# Patient Record
Sex: MALE | Race: WHITE | NOT HISPANIC OR LATINO | Employment: FULL TIME | URBAN - METROPOLITAN AREA
[De-identification: names, ages, dates, MRNs, and addresses within clinical notes are randomized per-mention and may not be internally consistent; named-entity substitution may affect disease eponyms.]

---

## 2020-04-29 ENCOUNTER — OFFICE VISIT (OUTPATIENT)
Dept: FAMILY MEDICINE CLINIC | Facility: CLINIC | Age: 50
End: 2020-04-29
Payer: COMMERCIAL

## 2020-04-29 VITALS
HEIGHT: 72 IN | WEIGHT: 211 LBS | SYSTOLIC BLOOD PRESSURE: 132 MMHG | TEMPERATURE: 97.5 F | DIASTOLIC BLOOD PRESSURE: 82 MMHG | BODY MASS INDEX: 28.58 KG/M2 | HEART RATE: 74 BPM | RESPIRATION RATE: 16 BRPM | OXYGEN SATURATION: 98 %

## 2020-04-29 DIAGNOSIS — M25.532 LEFT WRIST PAIN: ICD-10-CM

## 2020-04-29 DIAGNOSIS — M77.8 LEFT ELBOW TENDONITIS: Primary | ICD-10-CM

## 2020-04-29 DIAGNOSIS — F41.1 GENERALIZED ANXIETY DISORDER: ICD-10-CM

## 2020-04-29 PROCEDURE — 1036F TOBACCO NON-USER: CPT | Performed by: FAMILY MEDICINE

## 2020-04-29 PROCEDURE — 3008F BODY MASS INDEX DOCD: CPT | Performed by: FAMILY MEDICINE

## 2020-04-29 PROCEDURE — 99204 OFFICE O/P NEW MOD 45 MIN: CPT | Performed by: FAMILY MEDICINE

## 2020-04-29 RX ORDER — PAROXETINE 30 MG/1
1 TABLET, FILM COATED ORAL DAILY
COMMUNITY
Start: 2015-06-08

## 2020-06-02 VITALS
BODY MASS INDEX: 29.01 KG/M2 | HEIGHT: 72 IN | SYSTOLIC BLOOD PRESSURE: 126 MMHG | WEIGHT: 214.2 LBS | DIASTOLIC BLOOD PRESSURE: 90 MMHG

## 2020-06-02 DIAGNOSIS — M25.532 LEFT WRIST PAIN: ICD-10-CM

## 2020-06-02 DIAGNOSIS — M77.8 LEFT ELBOW TENDONITIS: ICD-10-CM

## 2020-06-02 DIAGNOSIS — G56.22 ULNAR NEURITIS, LEFT: ICD-10-CM

## 2020-06-02 DIAGNOSIS — M77.12 LATERAL EPICONDYLITIS OF LEFT ELBOW: Primary | ICD-10-CM

## 2020-06-02 PROCEDURE — 3008F BODY MASS INDEX DOCD: CPT | Performed by: FAMILY MEDICINE

## 2020-06-02 PROCEDURE — 20605 DRAIN/INJ JOINT/BURSA W/O US: CPT | Performed by: FAMILY MEDICINE

## 2020-06-02 PROCEDURE — 99204 OFFICE O/P NEW MOD 45 MIN: CPT | Performed by: FAMILY MEDICINE

## 2020-06-02 PROCEDURE — 1036F TOBACCO NON-USER: CPT | Performed by: FAMILY MEDICINE

## 2020-06-02 RX ORDER — TRIAMCINOLONE ACETONIDE 40 MG/ML
40 INJECTION, SUSPENSION INTRA-ARTICULAR; INTRAMUSCULAR
Status: COMPLETED | OUTPATIENT
Start: 2020-06-02 | End: 2020-06-02

## 2020-06-02 RX ORDER — LIDOCAINE HYDROCHLORIDE 10 MG/ML
2 INJECTION, SOLUTION INFILTRATION; PERINEURAL
Status: COMPLETED | OUTPATIENT
Start: 2020-06-02 | End: 2020-06-02

## 2020-06-02 RX ADMIN — LIDOCAINE HYDROCHLORIDE 2 ML: 10 INJECTION, SOLUTION INFILTRATION; PERINEURAL at 10:57

## 2020-06-02 RX ADMIN — TRIAMCINOLONE ACETONIDE 40 MG: 40 INJECTION, SUSPENSION INTRA-ARTICULAR; INTRAMUSCULAR at 10:57

## 2020-06-08 ENCOUNTER — EVALUATION (OUTPATIENT)
Dept: PHYSICAL THERAPY | Facility: CLINIC | Age: 50
End: 2020-06-08
Payer: COMMERCIAL

## 2020-06-08 DIAGNOSIS — M77.8 LEFT ELBOW TENDONITIS: Primary | ICD-10-CM

## 2020-06-08 DIAGNOSIS — M25.532 LEFT WRIST PAIN: ICD-10-CM

## 2020-06-08 PROCEDURE — 97110 THERAPEUTIC EXERCISES: CPT

## 2020-06-08 PROCEDURE — 97162 PT EVAL MOD COMPLEX 30 MIN: CPT

## 2020-06-11 ENCOUNTER — OFFICE VISIT (OUTPATIENT)
Dept: PHYSICAL THERAPY | Facility: CLINIC | Age: 50
End: 2020-06-11
Payer: COMMERCIAL

## 2020-06-11 DIAGNOSIS — M77.8 LEFT ELBOW TENDONITIS: Primary | ICD-10-CM

## 2020-06-11 DIAGNOSIS — M25.532 LEFT WRIST PAIN: ICD-10-CM

## 2020-06-11 PROCEDURE — 97110 THERAPEUTIC EXERCISES: CPT

## 2020-06-11 PROCEDURE — 97140 MANUAL THERAPY 1/> REGIONS: CPT

## 2020-06-15 ENCOUNTER — OFFICE VISIT (OUTPATIENT)
Dept: PHYSICAL THERAPY | Facility: CLINIC | Age: 50
End: 2020-06-15
Payer: COMMERCIAL

## 2020-06-15 DIAGNOSIS — M77.8 LEFT ELBOW TENDONITIS: Primary | ICD-10-CM

## 2020-06-15 DIAGNOSIS — M25.532 LEFT WRIST PAIN: ICD-10-CM

## 2020-06-15 PROCEDURE — 97110 THERAPEUTIC EXERCISES: CPT

## 2020-06-15 PROCEDURE — 97140 MANUAL THERAPY 1/> REGIONS: CPT

## 2020-06-18 ENCOUNTER — OFFICE VISIT (OUTPATIENT)
Dept: PHYSICAL THERAPY | Facility: CLINIC | Age: 50
End: 2020-06-18
Payer: COMMERCIAL

## 2020-06-18 DIAGNOSIS — M77.8 LEFT ELBOW TENDONITIS: Primary | ICD-10-CM

## 2020-06-18 DIAGNOSIS — M25.532 LEFT WRIST PAIN: ICD-10-CM

## 2020-06-18 PROCEDURE — 97140 MANUAL THERAPY 1/> REGIONS: CPT

## 2020-06-18 PROCEDURE — 97110 THERAPEUTIC EXERCISES: CPT

## 2020-06-22 ENCOUNTER — OFFICE VISIT (OUTPATIENT)
Dept: PHYSICAL THERAPY | Facility: CLINIC | Age: 50
End: 2020-06-22
Payer: COMMERCIAL

## 2020-06-22 DIAGNOSIS — M77.8 LEFT ELBOW TENDONITIS: Primary | ICD-10-CM

## 2020-06-22 DIAGNOSIS — M25.532 LEFT WRIST PAIN: ICD-10-CM

## 2020-06-22 PROCEDURE — 97140 MANUAL THERAPY 1/> REGIONS: CPT

## 2020-06-22 PROCEDURE — 97110 THERAPEUTIC EXERCISES: CPT

## 2020-06-25 ENCOUNTER — OFFICE VISIT (OUTPATIENT)
Dept: PHYSICAL THERAPY | Facility: CLINIC | Age: 50
End: 2020-06-25
Payer: COMMERCIAL

## 2020-06-25 DIAGNOSIS — M77.8 LEFT ELBOW TENDONITIS: Primary | ICD-10-CM

## 2020-06-25 DIAGNOSIS — M25.532 LEFT WRIST PAIN: ICD-10-CM

## 2020-06-25 PROCEDURE — 97140 MANUAL THERAPY 1/> REGIONS: CPT

## 2020-06-25 PROCEDURE — 97110 THERAPEUTIC EXERCISES: CPT

## 2020-06-29 ENCOUNTER — OFFICE VISIT (OUTPATIENT)
Dept: PHYSICAL THERAPY | Facility: CLINIC | Age: 50
End: 2020-06-29
Payer: COMMERCIAL

## 2020-06-29 DIAGNOSIS — M25.532 LEFT WRIST PAIN: ICD-10-CM

## 2020-06-29 DIAGNOSIS — M77.8 LEFT ELBOW TENDONITIS: Primary | ICD-10-CM

## 2020-06-29 PROCEDURE — 97110 THERAPEUTIC EXERCISES: CPT

## 2020-07-02 ENCOUNTER — OFFICE VISIT (OUTPATIENT)
Dept: PHYSICAL THERAPY | Facility: CLINIC | Age: 50
End: 2020-07-02
Payer: COMMERCIAL

## 2020-07-02 DIAGNOSIS — M25.532 LEFT WRIST PAIN: ICD-10-CM

## 2020-07-02 DIAGNOSIS — M77.8 LEFT ELBOW TENDONITIS: Primary | ICD-10-CM

## 2020-07-02 PROCEDURE — 97110 THERAPEUTIC EXERCISES: CPT

## 2020-07-02 PROCEDURE — 97140 MANUAL THERAPY 1/> REGIONS: CPT

## 2020-07-02 NOTE — PROGRESS NOTES
Daily Note     Today's date: 2020  Patient name: Milan Noguera  : 1970  MRN: [id number]  Referring provider: Naomy Lugo MD  Dx:   Encounter Diagnosis     ICD-10-CM    1  Left elbow tendonitis M77 8    2  Left wrist pain M25 532                   Subjective: Pt reports he had "stiffness" in L elbow when WBing through wrists while pushing loads  Denies pain  Objective: See treatment diary below      Assessment: Tolerated treatment well  Resumed manual techniques to address pt's subjective report of jt stiffness  Progressed elbow PRE's to [employer] machine to progress eccentric strengthening, pt tolerated well w/o onset of pain  Pt is making steady progress toward established goals  Patient exhibited good technique with therapeutic exercises and would benefit from continued PT      Plan: Continue per plan of care  Precautions: Standard      Manuals 6/11  x20' 6/15  x15' 6/18  x15' 6/22  x10' 6/25  x8' 6/29  x3' 7/2  x12'      C/s retract 3x10 BMG            L UT str  BMG BMG BMG  BMG        Radiohumeral distract BMG BMG BMG BMG BMG  BMG      Elbow lat jt gapping w/ BMG BMG BMG BMG   BMG      Humeroulnar distract BMG BMG BMG BMG BMG  BMG      STM L lat elbow BMG BMG BMG BMG BMG BMG BMG      Neuro Re-Ed             Ulnar n  glide 2x10 2x10  2x10 2x10 2x10 2x10 2x10                                                                                    Ther Ex             UE ergometer  NV 2'/2' 2'/2' lvl 2 2'/2' lvl 2/2'2' lvl 2 2'/2'      Digiflex 3 way neut wrist Green 1x10ea  Green 2x10 ea  Green 2x10 ea  Green 2x10 ea  Green 2x10 ea  Green 2x10 ea  Green 2x10 ea  HEP: c/s retract  2x10 3x10 3x10 3x10 3x10 3x10      HEP: wrist flex str  Pro/sup hammer 2x10 2x10 ea  2x10 3x10 3x10 3x10 3x10      Wrist ext ecc  2x10 3x10 3x10 3x10 3x10 3x10 GTB 3x10      Doorway pec str 30"x3 30"x3 30"x3 30"x3 30"x3 30"x3 30"x3      ADDED: N's, U's flexbar  x10 ea  2x10 2x10 2x10 ea  3x10 ea  3x10 ea  ADDED: Finger ext digiweb  Y 2x10 Y 2x10 Y 2x10 Y 3x10 Y 3x10 G 3x10      Digiweb shoulder pro/retract neutr  wrist      x10 ea  2x10 ea  Skull crushers   3# 2x10 3# 3x10 3# 3x10 4# 3x10 Mckeon 8# 2x10      Ecc  hammer curls    3# 2x10 3# 2x10 4# 3x10 Mckeon  8# 2x10      Reverse curl      4# 2x10 Mckeon  8# 2x10      Clothes pin    Blue+green x3 ea           Counter push up       2x5      Self elbow distract w/ towel      30"x3       Ther Activity             Lifting mechanics neut wrist     Crate (12#) deadlifts x20                     Gait Training                                       Modalities             MH Pre' L elbow x8' Pre L elbow x10' Pre L elbow x8' Pre L elbow x8' Pre L elbow x10' Pre L elbow x10' Pre L elbow x10'      IP Post x5'

## 2020-07-06 ENCOUNTER — OFFICE VISIT (OUTPATIENT)
Dept: PHYSICAL THERAPY | Facility: CLINIC | Age: 50
End: 2020-07-06
Payer: COMMERCIAL

## 2020-07-06 DIAGNOSIS — M77.8 LEFT ELBOW TENDONITIS: Primary | ICD-10-CM

## 2020-07-06 DIAGNOSIS — M25.532 LEFT WRIST PAIN: ICD-10-CM

## 2020-07-06 PROCEDURE — 97140 MANUAL THERAPY 1/> REGIONS: CPT

## 2020-07-06 PROCEDURE — 97110 THERAPEUTIC EXERCISES: CPT

## 2020-07-06 NOTE — PROGRESS NOTES
Daily Note     Today's date: 2020  Patient name: Corrie Lyons  : 1970  MRN: [id number]  Referring provider: David Morelos MD  Dx:   Encounter Diagnosis     ICD-10-CM    1  Left elbow tendonitis M77 8    2  Left wrist pain M25 532                   Subjective: Pt denies pain following progression of PRE's last session  Reports he continues to have "stiffness" in elbow when wrist is extended through CKC position  Objective: See treatment diary below      Assessment: Tolerated treatment well  Pt maintains neutral wrist position throughout PRE's independently  Noticeably less soft tissue restriction noted during STM to common extensors/supinator  Will continue to progress PRE's to simulate work related activities  Patient exhibited good technique with therapeutic exercises and would benefit from continued PT      Plan: Continue per plan of care  Precautions: Standard      Manuals 6/11  x20' 6/15  x15' 6/18  x15' 6/22  x10' 6/25  x8' 6/29  x3' 7/2  x12' 7/6  x12'     C/s retract 3x10 BMG            L UT str  BMG BMG BMG  BMG        Radiohumeral distract BMG BMG BMG BMG BMG  BMG BMG     Elbow lat jt gapping w/ BMG BMG BMG BMG   BMG BMG     Humeroulnar distract BMG BMG BMG BMG BMG  BMG BMG     STM L lat elbow BMG BMG BMG BMG BMG BMG BMG BMG     Neuro Re-Ed             Ulnar n  glide 2x10 2x10  2x10 2x10 2x10 2x10 2x10 2x10                                                                                   Ther Ex             UE ergometer  NV 2'/2' 2'/2' lvl 2 2'/2' lvl 2/2'2' lvl 2 2'/2' lvl 2 2'/2'     Digiflex 3 way neut wrist Green 1x10ea  Green 2x10 ea  Green 2x10 ea  Green 2x10 ea  Green 2x10 ea  Green 2x10 ea  Green 2x10 ea  --     HEP: c/s retract  2x10 3x10 3x10 3x10 3x10 3x10 3x10     HEP: wrist flex str  Pro/sup hammer 2x10 2x10 ea  2x10 3x10 3x10 3x10 3x10 --     Wrist ext ecc   2x10 3x10 3x10 3x10 3x10 3x10 GTB 3x10 GTB 3x10     Doorway pec str 30"x3 30"x3 30"x3 30"x3 30"x3 30"x3 30"x3 30"x3     ADDED: N's, U's flexbar  x10 ea  2x10 2x10 2x10 ea  3x10 ea  3x10 ea  3x10 ea  ADDED: Finger ext digiweb  Y 2x10 Y 2x10 Y 2x10 Y 3x10 Y 3x10 G 3x10 G 3x10     Digiweb shoulder pro/retract neutr  wrist      x10 ea  2x10 ea  Skull crushers   3# 2x10 3# 3x10 3# 3x10 4# 3x10 Mckeon 8# 2x10 Mckeon 8# 2x10     Ecc  hammer curls    3# 2x10 3# 2x10 4# 3x10 Mckeon  8# 2x10 Mckeon 8# 2x10     Reverse curl      4# 2x10 Mckeon  8# 2x10 Mckeon 8# 2x10     Clothes pin    Blue+green x3 ea           Counter push up       2x5 2x5     Self elbow distract w/ towel      30"x3       Ther Activity             Lifting mechanics neut wrist     Crate (12#) deadlifts x20                     Gait Training                                       Modalities             MH Pre' L elbow x8' Pre L elbow x10' Pre L elbow x8' Pre L elbow x8' Pre L elbow x10' Pre L elbow x10' Pre L elbow x10' Pre L elbow x10'     IP Post x5'

## 2020-07-09 ENCOUNTER — OFFICE VISIT (OUTPATIENT)
Dept: PHYSICAL THERAPY | Facility: CLINIC | Age: 50
End: 2020-07-09
Payer: COMMERCIAL

## 2020-07-09 DIAGNOSIS — M25.532 LEFT WRIST PAIN: ICD-10-CM

## 2020-07-09 DIAGNOSIS — M77.8 LEFT ELBOW TENDONITIS: Primary | ICD-10-CM

## 2020-07-09 PROCEDURE — 97110 THERAPEUTIC EXERCISES: CPT

## 2020-07-09 PROCEDURE — 97140 MANUAL THERAPY 1/> REGIONS: CPT

## 2020-07-09 NOTE — PROGRESS NOTES
Daily Note     Today's date: 2020  Patient name: Cyndi Gutierrez  : 1970  MRN: [id number]  Referring provider: Abel Lomax MD  Dx:   Encounter Diagnosis     ICD-10-CM    1  Left elbow tendonitis M77 8    2  Left wrist pain M25 532                   Subjective: Pt reports "stiffness" in lateral elbow  Denies pain  Objective: See treatment diary below      Assessment: Tolerated treatment well  Pt reported "clicking" during pronation/supination, unaffected by manual techniques  Continues to demonstrate proper form maintaining neutral wrist alignment during lifting exercises  Pt is making steady progress toward established goals  Patient exhibited good technique with therapeutic exercises and would benefit from continued PT      Plan: Continue per plan of care  Precautions: Standard      Manuals 6/11  x20' 6/15  x15' 6/18  x15' 6/22  x10' 6/25  x8' 6/29  x3' 7/2  x12' 7/6  x12' 7/9  x12'    C/s retract 3x10 BMG            L UT str  BMG BMG BMG  BMG        Radiohumeral distract BMG BMG BMG BMG BMG  BMG BMG BMG    Elbow lat jt gapping w/ BMG BMG BMG BMG   BMG BMG BMG    Humeroulnar distract BMG BMG BMG BMG BMG  BMG BMG BMG    STM L lat elbow BMG BMG BMG BMG BMG BMG BMG BMG BMG    Neuro Re-Ed             Ulnar n  glide 2x10 2x10  2x10 2x10 2x10 2x10 2x10 2x10 2x10                                                                                  Ther Ex             UE ergometer  NV 2'/2' 2'/2' lvl 2 2'/2' lvl 2/2'2' lvl 2 2'/2' lvl 2 2'/2' lvl 2 2'/2'    Digiflex 3 way neut wrist Green 1x10ea  Green 2x10 ea  Green 2x10 ea  Green 2x10 ea  Green 2x10 ea  Green 2x10 ea  Green 2x10 ea  --     HEP: c/s retract  2x10 3x10 3x10 3x10 3x10 3x10 3x10     HEP: wrist flex str  Pro/sup hammer 2x10 2x10 ea  2x10 3x10 3x10 3x10 3x10 -- 3# 3x10    Wrist ext ecc   2x10 3x10 3x10 3x10 3x10 3x10 GTB 3x10 GTB 3x10 GTB 3x10    Doorway pec str 30"x3 30"x3 30"x3 30"x3 30"x3 30"x3 30"x3 30"x3 30"x3 ADDED: N's, U's flexbar  x10 ea  2x10 2x10 2x10 ea  3x10 ea  3x10 ea  3x10 ea  ecc 2x10 ea  ADDED: Finger ext digiweb  Y 2x10 Y 2x10 Y 2x10 Y 3x10 Y 3x10 G 3x10 G 3x10     Digiweb shoulder pro/retract neutr  wrist      x10 ea  2x10 ea  Skull crushers   3# 2x10 3# 3x10 3# 3x10 4# 3x10 Mckeon 8# 2x10 Mckeon 8# 2x10 Mckeon 8# 3x10    Ecc  hammer curls    3# 2x10 3# 2x10 4# 3x10 Mckeon  8# 2x10 Mckeon 8# 2x10 Mckeon 8# 2x10    Reverse curl      4# 2x10 Mckeon  8# 2x10 Mckeon 8# 2x10 Mckeon 8#2x10    Lateral raise         3# 2x10                 Clothes pin    Blue+green x3 ea           Counter push up       2x5 2x5 2x5    Self elbow distract w/ towel      30"x3       Ther Activity             Lifting mechanics neut wrist     Crate (12#) deadlifts x20                     Gait Training                                       Modalities              Pre' L elbow x8' Pre L elbow x10' Pre L elbow x8' Pre L elbow x8' Pre L elbow x10' Pre L elbow x10' Pre L elbow x10' Pre L elbow x10' Pre L elbow x10'    IP Post x5'

## 2020-07-13 ENCOUNTER — OFFICE VISIT (OUTPATIENT)
Dept: PHYSICAL THERAPY | Facility: CLINIC | Age: 50
End: 2020-07-13
Payer: COMMERCIAL

## 2020-07-13 DIAGNOSIS — M25.532 LEFT WRIST PAIN: ICD-10-CM

## 2020-07-13 DIAGNOSIS — M77.8 LEFT ELBOW TENDONITIS: Primary | ICD-10-CM

## 2020-07-13 PROCEDURE — 97110 THERAPEUTIC EXERCISES: CPT

## 2020-07-13 PROCEDURE — 97140 MANUAL THERAPY 1/> REGIONS: CPT

## 2020-07-13 NOTE — PROGRESS NOTES
Daily Note     Today's date: 2020  Patient name: Corrie Lyons  : 1970  MRN: [id number]  Referring provider: David Morelos MD  Dx:   Encounter Diagnosis     ICD-10-CM    1  Left elbow tendonitis M77 8    2  Left wrist pain M25 532                   Subjective: Pt reports he had "stiffness" in L elbow over the weekend while lifting slates  Denies baseline pain  Objective: See treatment diary below      Assessment: Tolerated treatment well  Continues to demonstrate clicking with supination PRE, although not present during MRE  Pt reported minimal discomfort during wrist ext w/ radial deviation, likely d/t involvement of ECRB tendonitis  Will progress eccentric strengthening and [address] activities next session  Patient would benefit from continued PT      Plan: Continue per plan of care  Precautions: Standard      Manuals 6/11  x20' 6/15  x15' 6/18  x15' 6/22  x10' 6/25  x8' 6/29  x3' 7/2  x12' /6  x12' /9  x12' 7/13  x8'   C/s retract 3x10 BMG            L UT str  BMG BMG BMG  BMG        Radiohumeral distract BMG BMG BMG BMG BMG  BMG BMG BMG    Elbow lat jt gapping w/ BMG BMG BMG BMG   BMG BMG BMG BMG   Humeroulnar distract BMG BMG BMG BMG BMG  BMG BMG BMG    STM L lat elbow BMG BMG BMG BMG BMG BMG BMG BMG BMG BMG   MRE:Supination/pronation          BMG   Neuro Re-Ed             Ulnar n  glide 2x10 2x10  2x10 2x10 2x10 2x10 2x10 2x10 2x10 2x10   Ther Ex             UE ergometer  NV 2'/2' 2'/2' lvl 2 2'/2' lvl 2/2'2' lvl 2 2'/2' lvl 2 2'/2' lvl 2 2'/2' lvl 3 2'/2'   Digiflex 3 way neut wrist Green 1x10ea  Green 2x10 ea  Green 2x10 ea  Green 2x10 ea  Green 2x10 ea  Green 2x10 ea  Green 2x10 ea  --     HEP: c/s retract  2x10 3x10 3x10 3x10 3x10 3x10 3x10     Wrist ext w/ radial dev            2# 2x10   Pro/sup hammer 2x10 2x10 ea  2x10 3x10 3x10 3x10 3x10 -- 3# 3x10 3# 3x10   Wrist ext ecc   2x10 3x10 3x10 3x10 3x10 3x10 GTB 3x10 GTB 3x10 GTB 3x10    Doorway pec str 30"x3 30"x3 30"x3 30"x3 30"x3 30"x3 30"x3 30"x3 30"x3 30"x3   ADDED: N's, U's flexbar  x10 ea  2x10 2x10 2x10 ea  3x10 ea  3x10 ea  3x10 ea  ecc 2x10 ea  ecc 2x10 ea  ADDED: Finger ext digiweb  Y 2x10 Y 2x10 Y 2x10 Y 3x10 Y 3x10 G 3x10 G 3x10     Digiweb shoulder pro/retract neutr  wrist      x10 ea  2x10 ea  Skull crushers   3# 2x10 3# 3x10 3# 3x10 4# 3x10 Mckeon 8# 2x10 Mckeon 8# 2x10 Mckeon 8# 3x10 9# 3x10   Ecc  hammer curls    3# 2x10 3# 2x10 4# 3x10 Mckeon  8# 2x10 Mckeon 8# 2x10 Mckeon 8# 2x10 [email or web address]  9# 2x10   Reverse curl      4# 2x10 Mckeon  8# 2x10 Mckeon 8# 2x10 Mckeon 8#2x10 S'@ 90 9# 2x10   Lateral raise         3# 2x10 3# 2x10                Clothes pin    Blue+green x3 ea           Counter push up       2x5 2x5 2x5 2x10   Self elbow distract w/ towel      30"x3       Ther Activity             Lifting mechanics neut wrist     Crate (12#) deadlifts x20                     Gait Training                                       Modalities             MH Pre' L elbow x8' Pre L elbow x10' Pre L elbow x8' Pre L elbow x8' Pre L elbow x10' Pre L elbow x10' Pre L elbow x10' Pre L elbow x10' Pre L elbow x10' Pre L elbow x10'   IP Post x5'

## 2020-07-16 ENCOUNTER — OFFICE VISIT (OUTPATIENT)
Dept: PHYSICAL THERAPY | Facility: CLINIC | Age: 50
End: 2020-07-16
Payer: COMMERCIAL

## 2020-07-16 DIAGNOSIS — M77.8 LEFT ELBOW TENDONITIS: Primary | ICD-10-CM

## 2020-07-16 DIAGNOSIS — M25.532 LEFT WRIST PAIN: ICD-10-CM

## 2020-07-16 PROCEDURE — 97140 MANUAL THERAPY 1/> REGIONS: CPT

## 2020-07-16 PROCEDURE — 97110 THERAPEUTIC EXERCISES: CPT

## 2020-07-16 NOTE — PROGRESS NOTES
Daily Note     Today's date: 2020  Patient name: Gigi Posada  : 1970  MRN: [id number]  Referring provider: Yobany Castanon MD  Dx:   Encounter Diagnosis     ICD-10-CM    1  Left elbow tendonitis M77 8    2  Left wrist pain M25 532                   Subjective: Pt reports stiffness in elbow is "about the same "       Objective: See treatment diary below      Assessment: Tolerated treatment well  Performed pronation/supination in CKC position w/o clicking which is present during [facility]  Patient exhibited good technique with therapeutic exercises and would benefit from continued PT      Plan: Continue per plan of care  Precautions: Standard      Manuals 7/16  x10' 6/15  x15' 6/18  x15' 6/22  x10' 6/25  x8' 6/29  x3' 7/2  x12' 7/6  x12' 7/9  x12' 7/13  x8'   C/s retract             L UT str  BMG BMG  BMG        Radiohumeral distract BMG BMG BMG BMG BMG  BMG BMG BMG    Elbow lat jt gapping w/ BMG BMG BMG BMG   BMG BMG BMG BMG   Humeroulnar distract BMG BMG BMG BMG BMG  BMG BMG BMG    STM L lat elbow IASTM BMG BMG BMG BMG BMG BMG BMG BMG BMG BMG   MRE:Supination/pronation BMG 2x10 ea  BMG   Neuro Re-Ed             Ulnar n  glide  2x10  2x10 2x10 2x10 2x10 2x10 2x10 2x10 2x10   Ther Ex             UE ergometer  NV 2'/2' 2'/2' lvl 2 2'/2' lvl 2/2'2' lvl 2 2'/2' lvl 2 2'/2' lvl 2 2'/2' lvl 3 2'/2'   Digiflex 3 way neut wrist  Green 2x10 ea  Green 2x10 ea  Green 2x10 ea  Green 2x10 ea  Green 2x10 ea  Green 2x10 ea  --     HEP: c/s retract  2x10 3x10 3x10 3x10 3x10 3x10 3x10     Wrist ext w/ radial dev   2# 3x10         2# 2x10   Pro/sup hammer  2x10 ea  2x10 3x10 3x10 3x10 3x10 -- 3# 3x10 3# 3x10   Wrist ext ecc  3x10 3x10 3x10 3x10 3x10 GTB 3x10 GTB 3x10 GTB 3x10    Doorway pec str 30"x3 30"x3 30"x3 30"x3 30"x3 30"x3 30"x3 30"x3 30"x3 30"x3   ADDED: N's, U's flexbar NP x10 ea  2x10 2x10 2x10 ea  3x10 ea  3x10 ea  3x10 ea  ecc 2x10 ea  ecc 2x10 ea     ADDED: Finger ext digiweb G 3x10 Y 2x10 Y 2x10 Y 2x10 Y 3x10 Y 3x10 G 3x10 G 3x10     Digiweb ulnar/radial dev Y 2x10 ea  Digiweb shoulder pro/retract neutr  wrist      x10 ea  2x10 ea  Skull crushers 9# 3x10  3# 2x10 3# 3x10 3# 3x10 4# 3x10 Mckeon 8# 2x10 [place] 8# 2x10 [place] 8# 3x10 9# 3x10   Ecc  hammer curls S' @90 9# 3x10   3# 2x10 3# 2x10 4# 3x10 Mckeon  8# 2x10 Mckeon 8# 2x10 Mckeon 8# 2x10 [email or web address]  9# 2x10   Reverse curl      4# 2x10 Mckeon  8# 2x10 Mckeon 8# 2x10 [place] 8#2x10 S'@ 90 9# 2x10   Lateral raise Reverse 9# 3x10        3# 2x10 3# 2x10   Quadped sup/pro on sliders 2x10            Clothes pin    Blue+green x3 ea  Counter push up 2x10      2x5 2x5 2x5 2x10   Self elbow distract w/ towel      30"x3       Ther Activity             Lifting mechanics neut wrist     Crate (12#) deadlifts x20                     Gait Training                                       Modalities             MH x5' w/ 3 way  3x10 ea   Pre L elbow x10' Pre L elbow x8' Pre L elbow x8' Pre L elbow x10' Pre L elbow x10' Pre L elbow x10' Pre L elbow x10' Pre L elbow x10' Pre L elbow x10'   IP

## 2020-07-20 ENCOUNTER — OFFICE VISIT (OUTPATIENT)
Dept: PHYSICAL THERAPY | Facility: CLINIC | Age: 50
End: 2020-07-20
Payer: COMMERCIAL

## 2020-07-20 DIAGNOSIS — M77.8 LEFT ELBOW TENDONITIS: Primary | ICD-10-CM

## 2020-07-20 DIAGNOSIS — M25.532 LEFT WRIST PAIN: ICD-10-CM

## 2020-07-20 PROCEDURE — 97110 THERAPEUTIC EXERCISES: CPT

## 2020-07-20 PROCEDURE — 97140 MANUAL THERAPY 1/> REGIONS: CPT

## 2020-07-20 NOTE — PROGRESS NOTES
Daily Note     Today's date: 2020  Patient name: Zachary Fernandez  : 1970  MRN: [id number]  Referring provider: Zuleima Rubin MD  Dx:   Encounter Diagnosis     ICD-10-CM    1  Left elbow tendonitis M77 8    2  Left wrist pain M25 532                   Subjective: Pt denies pain in L elbow  Reports "a little less stiffness" in L elbow  Objective: See treatment diary below      Assessment: Tolerated treatment well  Clicking present during MRE pronation, abolished when wrist manually stabilized  Demonstrates muscular fatigue during wrist eccentric ext and elbow PRE's at [facility], however, maintains proper form throughout  Pt is making steady progress toward established goals  Patient would benefit from continued PT      Plan: Continue per plan of care  Precautions: Standard      Manuals 7/16  x10' /20  x10' 18  x15' 6/22  x10' 6/25  x8' 6/29  x3' 7/2  x12' 7/6  x12' 7/9  x12' /13  x8'   C/s retract             L UT str  BMG  BMG        Radiohumeral distract BMG BMG BMG BMG BMG  BMG BMG BMG    Elbow lat jt gapping w/ BMG BMG BMG BMG   BMG BMG BMG BMG   Humeroulnar distract BMG BMG BMG BMG BMG  BMG BMG BMG    STM L lat elbow IASTM BMG IASTM BMG BMG BMG BMG BMG BMG BMG BMG BMG   MRE:Supination/pronation BMG 2x10 ea  BMG        BMG   Neuro Re-Ed             Ulnar n  glide   2x10 2x10 2x10 2x10 2x10 2x10 2x10 2x10   Ther Ex             UE ergometer  lvl 3 2'/2' 2'/2' 2'/2' lvl 2 2'/2' lvl 2/2'2' lvl 2 2'/2' lvl 2 2'/2' lvl 2 2'/2' lvl 3 2'/2'   Digiflex 3 way neut wrist   Green 2x10 ea  Green 2x10 ea  Green 2x10 ea  Green 2x10 ea  Green 2x10 ea  --     HEP: c/s retract   3x10 3x10 3x10 3x10 3x10 3x10     Wrist ext w/ radial dev   2# 3x10 2# 3x10        2# 2x10   Pro/sup hammer   2x10 3x10 3x10 3x10 3x10 -- 3# 3x10 3# 3x10   Wrist ext ecc    10# 2x10 3x10 3x10 3x10 3x10 GTB 3x10 GTB 3x10 GTB 3x10    Doorway pec str 30"x3  30"x3 30"x3 30"x3 30"x3 30"x3 30"x3 30"x3 30"x3   ADDED: N's, U's flexbar NP  2x10 2x10 2x10 ea  3x10 ea  3x10 ea  3x10 ea  ecc 2x10 ea  ecc 2x10 ea  ADDED: Finger ext digiweb G 3x10 G 3x10 Y 2x10 Y 2x10 Y 3x10 Y 3x10 G 3x10 G 3x10     Digiweb ulnar/radial dev Y 2x10 ea  Y 2x10           Digiweb shoulder pro/retract neutr  wrist      x10 ea  2x10 ea  Skull crushers 9# 3x10 9# 3x10 3# 2x10 3# 3x10 3# 3x10 4# 3x10 Mckeon 8# 2x10 [place] 8# 2x10 Linda [place] 8# 3x10 9# 3x10   Ecc  hammer curls S' @90 9# 3x10 S' @ 90 9# 3x10  3# 2x10 3# 2x10 4# 3x10 Mckeon  8# 2x10 Mckeon 8# 2x10 Mckeon 8# 2x10 [email or web address]  9# 2x10   Reverse curl      4# 2x10 Mckeon  8# 2x10 Mckeon 8# 2x10 Mckeon 8#2x10 S'@ 90 9# 2x10   Lateral raise Reverse 9# 3x10 Reverse 9# 3x10       3# 2x10 3# 2x10   ADDED: reverse curls  10# 2x10           Quadped sup/pro on sliders 2x10 2x10 ea  Clothes pin    Blue+green x3 ea  Counter push up 2x10 2x10     2x5 2x5 2x5 2x10   Self elbow distract w/ towel      30"x3       Ther Activity             Lifting mechanics neut wrist     Crate (12#) deadlifts x20                     Gait Training                                       Modalities             MH x5' w/ 3 way  3x10 ea   Pre L elbow x10' Pre L elbow x8' Pre L elbow x8' Pre L elbow x10' Pre L elbow x10' Pre L elbow x10' Pre L elbow x10' Pre L elbow x10' Pre L elbow x10'   IP

## 2020-07-23 ENCOUNTER — OFFICE VISIT (OUTPATIENT)
Dept: PHYSICAL THERAPY | Facility: CLINIC | Age: 50
End: 2020-07-23
Payer: COMMERCIAL

## 2020-07-23 DIAGNOSIS — M25.532 LEFT WRIST PAIN: ICD-10-CM

## 2020-07-23 DIAGNOSIS — M77.8 LEFT ELBOW TENDONITIS: Primary | ICD-10-CM

## 2020-07-23 PROCEDURE — 97110 THERAPEUTIC EXERCISES: CPT

## 2020-07-23 PROCEDURE — 97140 MANUAL THERAPY 1/> REGIONS: CPT

## 2020-07-23 NOTE — PROGRESS NOTES
Daily Note     Today's date: 2020  Patient name: Charbel Morales  : 1970  MRN: [id number]  Referring provider: Padmini Curry MD  Dx:   Encounter Diagnosis     ICD-10-CM    1  Left elbow tendonitis M77 8    2  Left wrist pain M25 532                   Subjective: Pt denies pain in L elbow  Reports stiffness is "very minimal "      Objective: See treatment diary below      Assessment: Tolerated treatment well  Pt advised to slowly wean from reliance on elbow brace while at work to assess carryover of improvement in symptoms  Pt denied pain following session  Patient exhibited good technique with therapeutic exercises and would benefit from continued PT      Plan: Continue per plan of care  Precautions: Standard      Manuals 7/16  x10' 7/20  x10' 7/23  x10' 6/22  x10' 6/25  x8' 6/29  x3' 7/2  x12' 7/6  x12' 7/9  x12' 7/13  x8'   C/s retract             L UT str  BMG        Radiohumeral distract BMG BMG BMG BMG BMG  BMG BMG BMG    Elbow lat jt gapping w/ BMG BMG BMG BMG   BMG BMG BMG BMG   Humeroulnar distract BMG BMG BMG BMG BMG  BMG BMG BMG    STM L lat elbow IASTM BMG IASTM BMG IASTM BMG BMG BMG BMG BMG BMG BMG BMG   MRE:Supination/pronation BMG 2x10 ea  BMG BMG       BMG   Neuro Re-Ed             Ulnar n  glide    2x10 2x10 2x10 2x10 2x10 2x10 2x10   Ther Ex             UE ergometer  lvl 3 2'/2' lvl 3 2'/2' 2'/2' lvl 2 2'/2' lvl 2/2'2' lvl 2 2'/2' lvl 2 2'/2' lvl 2 2'/2' lvl 3 2'/2'   Digiflex 3 way neut wrist    Green 2x10 ea  Green 2x10 ea  Green 2x10 ea  Green 2x10 ea  --     HEP: c/s retract    3x10 3x10 3x10 3x10 3x10     Wrist ext w/ radial dev   2# 3x10 2# 3x10 2# 3x10       2# 2x10   Pro/sup hammer    3x10 3x10 3x10 3x10 -- 3# 3x10 3# 3x10   Wrist ext ecc  10# 2x10 10# 2x10 3x10 3x10 3x10 GTB 3x10 GTB 3x10 GTB 3x10    Doorway pec str 30"x3   30"x3 30"x3 30"x3 30"x3 30"x3 30"x3 30"x3   ADDED: N's, U's flexbar NP   2x10 2x10 ea  3x10 ea  3x10 ea    3x10 ea  ecc 2x10 ea  ecc 2x10 ea    ADDED: Finger ext digiweb G 3x10 G 3x10 G 3x10 Y 2x10 Y 3x10 Y 3x10 G 3x10 G 3x10     Digiweb ulnar/radial dev Y 2x10 ea  Y 2x10 Y 2x10 ea  Digiweb shoulder pro/retract neutr  wrist      x10 ea  2x10 ea  Skull crushers 9# 3x10 9# 3x10 BTB 3x15 3# 3x10 3# 3x10 4# 3x10 Mckeon 8# 2x10 [place] 8# 2x10 Mao [place] 8# 3x10 9# 3x10   Ecc  hammer curls S' @90 9# 3x10 S' @ 90 9# 3x10 BTB 3x15 3# 2x10 3# 2x10 4# 3x10 Mckeon  8# 2x10 Mckeon 8# 2x10 Mckeon 8# 2x10 [email or web address]  9# 2x10   Reverse curl      4# 2x10 Mckeon  8# 2x10 Mckeon 8# 2x10 Mckeon 8#2x10 S'@ 90 9# 2x10   Lateral raise Reverse 9# 3x10 Reverse 9# 3x10 BTB 3x15      3# 2x10 3# 2x10   Quadped sup/pro on sliders 2x10 2x10 ea  2x10 ea  Clothes pin    Blue+green x3 ea  Counter push up 2x10 2x10 2x10    2x5 2x5 2x5 2x10   Self elbow distract w/ towel      30"x3       Ther Activity             Lifting mechanics neut wrist     Crate (12#) deadlifts x20                     Gait Training                                       Modalities             MH x5' w/ 3 way  3x10 ea   Pre L elbow x10' Pre L elbow x5' Pre L elbow x8' Pre L elbow x10' Pre L elbow x10' Pre L elbow x10' Pre L elbow x10' Pre L elbow x10' Pre L elbow x10'   IP

## 2020-07-27 ENCOUNTER — OFFICE VISIT (OUTPATIENT)
Dept: PHYSICAL THERAPY | Facility: CLINIC | Age: 50
End: 2020-07-27
Payer: COMMERCIAL

## 2020-07-27 DIAGNOSIS — M77.8 LEFT ELBOW TENDONITIS: Primary | ICD-10-CM

## 2020-07-27 DIAGNOSIS — M25.532 LEFT WRIST PAIN: ICD-10-CM

## 2020-07-27 PROCEDURE — 97110 THERAPEUTIC EXERCISES: CPT

## 2020-07-27 NOTE — PROGRESS NOTES
Daily Note     Today's date: 2020  Patient name: Othel Lefort  : 1970  MRN: [id number]  Referring provider: Buck Sierra MD  Dx:   Encounter Diagnosis     ICD-10-CM    1  Left elbow tendonitis M77 8    2  Left wrist pain M25 532                   Subjective: Pt reports he began to wean from elbow brace during work  Reports he has reached 4 hrs w/o onset of pain  Objective: See treatment diary below      Assessment: Tolerated treatment well  Modified lateral elbow gapping w/  from manual technique to theraband in order to promote active recovery and in anticipation for upcoming D/C  Patient exhibited good technique with therapeutic exercises and would benefit from continued PT      Plan: Continue per plan of care  Potential D/C next visit  Precautions: Standard      Manuals 7/16  x10' 7/20  x10' 7/23  x10' 7/27  x10'  6/29  x3' 7/2  x12' 7/6  x12' 7/9  x12' 7/13  x8'   C/s retract             L UT str  Radiohumeral distract BMG BMG BMG BMG   BMG BMG BMG    Elbow lat jt gapping w/ BMG BMG BMG BMG   BMG BMG BMG BMG   Humeroulnar distract BMG BMG BMG BMG   BMG BMG BMG    STM L lat elbow IASTM BMG IASTM BMG IASTM BMG IASTM BMG  BMG BMG BMG BMG BMG   MRE:Supination/pronation BMG 2x10 ea  BMG BMG BMG      BMG   Neuro Re-Ed             Ulnar n  glide      2x10 2x10 2x10 2x10 2x10   Ther Ex             UE ergometer  lvl 3 2'/2' lvl 3 2'/2' lvl 3 2'/2'  lvl 2/2'2' lvl 2 2'/2' lvl 2 2'/2' lvl 2 2'/2' lvl 3 2'/2'   Digiflex 3 way neut wrist      Green 2x10 ea  Green 2x10 ea  --     HEP: c/s retract      3x10 3x10 3x10     Wrist ext w/ radial dev   2# 3x10 2# 3x10 2# 3x10       2# 2x10   Pro/sup hammer      3x10 3x10 -- 3# 3x10 3# 3x10   Wrist ext ecc  10# 2x10 10# 2x10 10# 3x10  3x10 GTB 3x10 GTB 3x10 GTB 3x10    Doorway pec str 30"x3     30"x3 30"x3 30"x3 30"x3 30"x3   ADDED: N's, U's flexbar NP     3x10 ea  3x10 ea  3x10 ea  ecc 2x10 ea  ecc 2x10 ea     ADDED: Finger ext digiweb G 3x10 G 3x10 G 3x10 G 3x10  Y 3x10 G 3x10 G 3x10     Digiweb ulnar/radial dev Y 2x10 ea  Y 2x10 Y 2x10 ea  Digiweb shoulder pro/retract neutr  wrist      x10 ea  2x10 ea  Skull crushers 9# 3x10 9# 3x10 BTB 3x15 9# 3x10  4# 3x10 Mckeon 8# 2x10 Rojas Puna 8# 2x10 Rojas Puna 8# 3x10 9# 3x10   Ecc  hammer curls S' @90 9# 3x10 S' @ 90 9# 3x10 BTB 3x15 S' @ 90 9# 3x10  4# 3x10 Mckeon  8# 2x10 Mckeon 8# 2x10 Mckeon 8# 2x10 [email or web address]  9# 2x10   Reverse curl      4# 2x10 Mckeon  8# 2x10 Mckeon 8# 2x10 Mckeon 8#2x10 S'@ 90 9# 2x10   Lateral raise Reverse 9# 3x10 Reverse 9# 3x10 BTB 3x15 Reverse 9# 3x10     3# 2x10 3# 2x10   Quadped sup/pro on sliders 2x10 2x10 ea  2x10 ea  2x10 ea  Clothes pin             Counter push up 2x10 2x10 2x10 2x10   2x5 2x5 2x5 2x10   Self elbow distract w/ towel      30"x3       Ther Activity             Lifting mechanics neut wrist                          Gait Training                                       Modalities             MH x5' w/ 3 way  3x10 ea   Pre L elbow x10' Pre L elbow x5' Pre L elbow x10'  Pre L elbow x10' Pre L elbow x10' Pre L elbow x10' Pre L elbow x10' Pre L elbow x10'   IP

## 2020-07-30 ENCOUNTER — OFFICE VISIT (OUTPATIENT)
Dept: PHYSICAL THERAPY | Facility: CLINIC | Age: 50
End: 2020-07-30
Payer: COMMERCIAL

## 2020-07-30 DIAGNOSIS — M25.532 LEFT WRIST PAIN: ICD-10-CM

## 2020-07-30 DIAGNOSIS — M77.8 LEFT ELBOW TENDONITIS: Primary | ICD-10-CM

## 2020-07-30 PROCEDURE — 97110 THERAPEUTIC EXERCISES: CPT

## 2020-07-30 NOTE — PROGRESS NOTES
PT DISCHARGE    Today's date: 2020  Patient name: [facility]  : 1970  MRN: [id number]  Referring provider: Talat Pastor MD  Dx:   Encounter Diagnosis     ICD-10-CM    1  Left elbow tendonitis M77 8    2  Left wrist pain M25 532                   Assessment  Assessment details: Roney Guzman has consistently participated in PT for several weeks including diligence w/ HEP compliance  Pt demonstrates improved strength, ROM, function, and improved body mechanics  Pt is D/C at this time w/ updated HEP d/t all goals met and return to PLOF pain free  Understanding of Dx/Px/POC: good   Prognosis: good    Goals  Impairment Goals  - Decrease pain less than 2/10 at worst in last 24 hrs in 4 weeks (MET 20)  - Improve L wrist flex ROM by atleast 10 degrees in 4 weeks (MET 20)    Functional Goals  - Pt will report [place] of atleast 75% in 10 weeks  (MET 90% 20)  - Pt will demo proper lifting mechanics 25# box without pain in 10 weeks in order to return to work activities pain free  (MET 20)  - Improve L wrist flex ROM by atleast 20 degrees in 10 weeks (MET 20)  - Return to Prior Level of Function in 10 weeks (MET 20)  - Increase Functional Status Measure to: atleast 65 in 10 weeks (MET 20)  - Patient will be independent with HEP in 10 weeks (MET 20)    Plan  Plan details: D/C w/ updated HEP  Planned modality interventions: TENS, thermotherapy: hydrocollator packs and cryotherapy  Other planned therapy interventions: D/C  Treatment plan discussed with: patient        Subjective Evaluation    History of Present Illness  Mechanism of injury: Pt reports he has been pain free for several weeks  He has began to resume lifting weights at work without off  brace and without pain  Reports [place] of 90%    Pain  Current pain ratin  At best pain ratin  At worst pain ratin    Hand dominance: right    Treatments  Previous treatment: injection treatment  Patient Goals  Patient goals for therapy: decreased pain, increased strength and return to sport/leisure activities          Objective       Posture:     AROM:   R  L  Wrist flex         45  50  Wrist ext        50  50  Supination  90  90  Pronation  90  90  Elbow flex  WNL  WNL  Elbow Ext   WNL  WNL      Cervical AROM loss:   Flex: nil    Ext: neal    R rot: nil    L rot: nil    R SB: mod    L SB: mod    Retraction: mod     MMT:    R  L                         Wrist flex         5/5  5/5  Wrist ext        5/5  5/5  Supination  5/5  5/5  Pronation  5/5  5/5  Elbow flex  5/5  5/5  Elbow Ext   5/5  5/5   strength (#) 60/65/65 55/60/65       Tenderness/Palpation:   (-) TTP wad of 3, lateral epicondyle         Precautions: Standard      Manuals 7/16  x10' 7/20  x10' 7/23  x10' 7/27  x10' 7/30 6/29  x3' 7/2  x12' 7/6  x12' 7/9  x12' 7/13  x8'   C/s retract             L UT str  Radiohumeral distract BMG BMG BMG BMG   BMG BMG BMG    Elbow lat jt gapping w/ BMG BMG BMG BMG   BMG BMG BMG BMG   Humeroulnar distract BMG BMG BMG BMG   BMG BMG BMG    STM L lat elbow IASTM BMG IASTM BMG IASTM BMG IASTM BMG  BMG BMG BMG BMG BMG   MRE:Supination/pronation BMG 2x10 ea  BMG BMG BMG      BMG   Neuro Re-Ed             Ulnar n  glide      2x10 2x10 2x10 2x10 2x10   Ther Ex             UE ergometer  lvl 3 2'/2' lvl 3 2'/2' lvl 3 2'/2' lvl 3 2'/2' lvl 2/2'2' lvl 2 2'/2' lvl 2 2'/2' lvl 2 2'/2' lvl 3 2'/2'   Digiflex 3 way neut wrist      Green 2x10 ea  Green 2x10 ea  --     HEP: c/s retract      3x10 3x10 3x10     Wrist ext w/ radial dev   2# 3x10 2# 3x10 2# 3x10       2# 2x10   Pro/sup hammer      3x10 3x10 -- 3# 3x10 3# 3x10   Wrist ext ecc  10# 2x10 10# 2x10 10# 3x10 10# 3x10 3x10 GTB 3x10 GTB 3x10 GTB 3x10    Doorway pec str 30"x3     30"x3 30"x3 30"x3 30"x3 30"x3   ADDED: N's, U's flexbar NP     3x10 ea  3x10 ea  3x10 ea  ecc 2x10 ea  ecc 2x10 ea     ADDED: Finger ext digiweb G 3x10 G 3x10 G 3x10 G 3x10  Y 3x10 G 3x10 G 3x10     Digiweb ulnar/radial dev Y 2x10 ea  Y 2x10 Y 2x10 ea  Digiweb shoulder pro/retract neutr  wrist      x10 ea  2x10 ea  Skull crushers 9# 3x10 9# 3x10 BTB 3x15 9# 3x10 GTB 3x10 4# 3x10 Mckeon 8# 2x10 Mckeon 8# 2x10 Addie Fong 8# 3x10 9# 3x10   Ecc  hammer curls S' @90 9# 3x10 S' @ 90 9# 3x10 BTB 3x15 S' @ 90 9# 3x10 GTB 3x10 4# [address] [facility]  8# 2x10 [place] 8# 2x10 Mckeon 8# 2x10 [email or web address]  9# 2x10   Reverse curl      4# 2x10 Mckeon  8# 2x10 Mckeon 8# 2x10 Mckeon 8#2x10 S'@ 90 9# 2x10   Lateral raise Reverse 9# 3x10 Reverse 9# 3x10 BTB 3x15 Reverse 9# 3x10 GTB 3x10    3# 2x10 3# 2x10   Quadped sup/pro on sliders 2x10 2x10 ea  2x10 ea  2x10 ea  2x10 ea  Clothes pin             Counter push up 2x10 2x10 2x10 2x10 2x10  2x5 2x5 2x5 2x10   Self elbow distract w/ towel      30"x3       Ther Activity             Lifting mechanics neut wrist                          Gait Training                                       Modalities             MH x5' w/ 3 way  3x10 ea   Pre L elbow x10' Pre L elbow x5' Pre L elbow x10'  Pre L elbow x10' Pre L elbow x10' Pre L elbow x10' Pre L elbow x10' Pre L elbow x10'   IP

## 2020-10-15 ENCOUNTER — OFFICE VISIT (OUTPATIENT)
Dept: FAMILY MEDICINE CLINIC | Facility: CLINIC | Age: 50
End: 2020-10-15
Payer: COMMERCIAL

## 2020-10-15 VITALS
HEIGHT: 70 IN | WEIGHT: 204.5 LBS | OXYGEN SATURATION: 96 % | TEMPERATURE: 97.4 F | DIASTOLIC BLOOD PRESSURE: 78 MMHG | HEART RATE: 78 BPM | BODY MASS INDEX: 29.28 KG/M2 | SYSTOLIC BLOOD PRESSURE: 100 MMHG

## 2020-10-15 DIAGNOSIS — Z13.0 SCREENING FOR IRON DEFICIENCY ANEMIA: ICD-10-CM

## 2020-10-15 DIAGNOSIS — Z13.29 SCREENING FOR THYROID DISORDER: ICD-10-CM

## 2020-10-15 DIAGNOSIS — Z13.220 SCREENING FOR LIPID DISORDERS: ICD-10-CM

## 2020-10-15 DIAGNOSIS — Z13.0 SCREENING FOR ENDOCRINE, METABOLIC AND IMMUNITY DISORDER: ICD-10-CM

## 2020-10-15 DIAGNOSIS — Z13.228 SCREENING FOR ENDOCRINE, METABOLIC AND IMMUNITY DISORDER: ICD-10-CM

## 2020-10-15 DIAGNOSIS — Z13.29 SCREENING FOR ENDOCRINE, METABOLIC AND IMMUNITY DISORDER: ICD-10-CM

## 2020-10-15 DIAGNOSIS — Z12.5 SCREENING FOR PROSTATE CANCER: ICD-10-CM

## 2020-10-15 DIAGNOSIS — Z00.00 ANNUAL PHYSICAL EXAM: Primary | ICD-10-CM

## 2020-10-15 PROCEDURE — 1036F TOBACCO NON-USER: CPT | Performed by: FAMILY MEDICINE

## 2020-10-15 PROCEDURE — 99396 PREV VISIT EST AGE 40-64: CPT | Performed by: FAMILY MEDICINE

## 2020-11-02 LAB
ALBUMIN SERPL-MCNC: 4.3 G/DL (ref 3.6–5.1)
ALBUMIN/GLOB SERPL: 2 (CALC) (ref 1–2.5)
ALP SERPL-CCNC: 88 U/L (ref 35–144)
ALT SERPL-CCNC: 18 U/L (ref 9–46)
AST SERPL-CCNC: 17 U/L (ref 10–35)
BASOPHILS # BLD AUTO: 53 CELLS/UL (ref 0–200)
BASOPHILS NFR BLD AUTO: 1.1 %
BILIRUB SERPL-MCNC: 0.6 MG/DL (ref 0.2–1.2)
BUN SERPL-MCNC: 16 MG/DL (ref 7–25)
BUN/CREAT SERPL: NORMAL (CALC) (ref 6–22)
CALCIUM SERPL-MCNC: 9.1 MG/DL (ref 8.6–10.3)
CHLORIDE SERPL-SCNC: 102 MMOL/L (ref 98–110)
CHOLEST SERPL-MCNC: 181 MG/DL
CHOLEST/HDLC SERPL: 3.1 (CALC)
CO2 SERPL-SCNC: 32 MMOL/L (ref 20–32)
CREAT SERPL-MCNC: 0.87 MG/DL (ref 0.7–1.33)
EOSINOPHIL # BLD AUTO: 274 CELLS/UL (ref 15–500)
EOSINOPHIL NFR BLD AUTO: 5.7 %
ERYTHROCYTE [DISTWIDTH] IN BLOOD BY AUTOMATED COUNT: 13.1 % (ref 11–15)
GLOBULIN SER CALC-MCNC: 2.1 G/DL (CALC) (ref 1.9–3.7)
GLUCOSE SERPL-MCNC: 93 MG/DL (ref 65–99)
HCT VFR BLD AUTO: 45.3 % (ref 38.5–50)
HDLC SERPL-MCNC: 58 MG/DL
HGB BLD-MCNC: 15 G/DL (ref 13.2–17.1)
LDLC SERPL CALC-MCNC: 103 MG/DL (CALC)
LYMPHOCYTES # BLD AUTO: 1766 CELLS/UL (ref 850–3900)
LYMPHOCYTES NFR BLD AUTO: 36.8 %
MCH RBC QN AUTO: 30.1 PG (ref 27–33)
MCHC RBC AUTO-ENTMCNC: 33.1 G/DL (ref 32–36)
MCV RBC AUTO: 91 FL (ref 80–100)
MONOCYTES # BLD AUTO: 475 CELLS/UL (ref 200–950)
MONOCYTES NFR BLD AUTO: 9.9 %
NEUTROPHILS # BLD AUTO: 2232 CELLS/UL (ref 1500–7800)
NEUTROPHILS NFR BLD AUTO: 46.5 %
NONHDLC SERPL-MCNC: 123 MG/DL (CALC)
PLATELET # BLD AUTO: 248 THOUSAND/UL (ref 140–400)
PMV BLD REES-ECKER: 11 FL (ref 7.5–12.5)
POTASSIUM SERPL-SCNC: 4.2 MMOL/L (ref 3.5–5.3)
PROT SERPL-MCNC: 6.4 G/DL (ref 6.1–8.1)
PSA SERPL-MCNC: 0.9 NG/ML
RBC # BLD AUTO: 4.98 MILLION/UL (ref 4.2–5.8)
SL AMB EGFR AFRICAN AMERICAN: 116 ML/MIN/1.73M2
SL AMB EGFR NON AFRICAN AMERICAN: 100 ML/MIN/1.73M2
SODIUM SERPL-SCNC: 139 MMOL/L (ref 135–146)
TRIGL SERPL-MCNC: 105 MG/DL
TSH SERPL-ACNC: 0.72 MIU/L (ref 0.4–4.5)
WBC # BLD AUTO: 4.8 THOUSAND/UL (ref 3.8–10.8)

## 2020-11-03 ENCOUNTER — TELEPHONE (OUTPATIENT)
Dept: FAMILY MEDICINE CLINIC | Facility: CLINIC | Age: 50
End: 2020-11-03

## 2021-01-25 ENCOUNTER — TELEPHONE (OUTPATIENT)
Dept: FAMILY MEDICINE CLINIC | Facility: CLINIC | Age: 51
End: 2021-01-25

## 2021-01-25 NOTE — TELEPHONE ENCOUNTER
Patient dropped off a [employer] form on Thursday to be signed by you  Do you recall completing this form? I did not see this form in patient's chart or up front  Please advise patient    [phone]

## 2021-04-05 DIAGNOSIS — Z23 ENCOUNTER FOR IMMUNIZATION: ICD-10-CM

## 2021-04-14 ENCOUNTER — IMMUNIZATIONS (OUTPATIENT)
Dept: FAMILY MEDICINE CLINIC | Facility: HOSPITAL | Age: 51
End: 2021-04-14

## 2021-04-14 DIAGNOSIS — Z23 ENCOUNTER FOR IMMUNIZATION: Primary | ICD-10-CM

## 2021-04-14 PROCEDURE — 91300 SARS-COV-2 / COVID-19 MRNA VACCINE (PFIZER-BIONTECH) 30 MCG: CPT

## 2021-04-14 PROCEDURE — 0001A SARS-COV-2 / COVID-19 MRNA VACCINE (PFIZER-BIONTECH) 30 MCG: CPT

## 2021-05-06 ENCOUNTER — IMMUNIZATIONS (OUTPATIENT)
Dept: FAMILY MEDICINE CLINIC | Facility: HOSPITAL | Age: 51
End: 2021-05-06

## 2021-05-06 DIAGNOSIS — Z23 ENCOUNTER FOR IMMUNIZATION: Primary | ICD-10-CM

## 2021-05-06 PROCEDURE — 91300 SARS-COV-2 / COVID-19 MRNA VACCINE (PFIZER-BIONTECH) 30 MCG: CPT

## 2021-05-06 PROCEDURE — 0002A SARS-COV-2 / COVID-19 MRNA VACCINE (PFIZER-BIONTECH) 30 MCG: CPT

## 2022-05-24 ENCOUNTER — OFFICE VISIT (OUTPATIENT)
Dept: FAMILY MEDICINE CLINIC | Facility: CLINIC | Age: 52
End: 2022-05-24
Payer: COMMERCIAL

## 2022-05-24 VITALS
SYSTOLIC BLOOD PRESSURE: 120 MMHG | HEIGHT: 70 IN | DIASTOLIC BLOOD PRESSURE: 80 MMHG | BODY MASS INDEX: 32.64 KG/M2 | WEIGHT: 228 LBS | TEMPERATURE: 97.3 F | OXYGEN SATURATION: 95 % | HEART RATE: 74 BPM

## 2022-05-24 DIAGNOSIS — F41.1 GENERALIZED ANXIETY DISORDER: ICD-10-CM

## 2022-05-24 DIAGNOSIS — Z13.0 SCREENING FOR IRON DEFICIENCY ANEMIA: ICD-10-CM

## 2022-05-24 DIAGNOSIS — B35.3 TINEA PEDIS OF BOTH FEET: ICD-10-CM

## 2022-05-24 DIAGNOSIS — Z13.29 SCREENING FOR THYROID DISORDER: ICD-10-CM

## 2022-05-24 DIAGNOSIS — Z13.29 SCREENING FOR ENDOCRINE, METABOLIC AND IMMUNITY DISORDER: ICD-10-CM

## 2022-05-24 DIAGNOSIS — K21.9 GASTROESOPHAGEAL REFLUX DISEASE WITHOUT ESOPHAGITIS: ICD-10-CM

## 2022-05-24 DIAGNOSIS — Z12.5 SCREENING FOR PROSTATE CANCER: ICD-10-CM

## 2022-05-24 DIAGNOSIS — Z13.228 SCREENING FOR ENDOCRINE, METABOLIC AND IMMUNITY DISORDER: ICD-10-CM

## 2022-05-24 DIAGNOSIS — Z00.00 WELL ADULT EXAM: Primary | ICD-10-CM

## 2022-05-24 DIAGNOSIS — Z11.59 NEED FOR HEPATITIS C SCREENING TEST: ICD-10-CM

## 2022-05-24 DIAGNOSIS — Z13.0 SCREENING FOR ENDOCRINE, METABOLIC AND IMMUNITY DISORDER: ICD-10-CM

## 2022-05-24 DIAGNOSIS — Z13.220 SCREENING FOR LIPID DISORDERS: ICD-10-CM

## 2022-05-24 DIAGNOSIS — T78.40XA ALLERGY, INITIAL ENCOUNTER: ICD-10-CM

## 2022-05-24 PROBLEM — M77.12 LATERAL EPICONDYLITIS OF LEFT ELBOW: Status: RESOLVED | Noted: 2020-06-02 | Resolved: 2022-05-24

## 2022-05-24 PROBLEM — G56.22 ULNAR NEURITIS, LEFT: Status: RESOLVED | Noted: 2020-06-02 | Resolved: 2022-05-24

## 2022-05-24 PROCEDURE — 1036F TOBACCO NON-USER: CPT | Performed by: FAMILY MEDICINE

## 2022-05-24 PROCEDURE — 3725F SCREEN DEPRESSION PERFORMED: CPT | Performed by: FAMILY MEDICINE

## 2022-05-24 PROCEDURE — 3008F BODY MASS INDEX DOCD: CPT | Performed by: FAMILY MEDICINE

## 2022-05-24 PROCEDURE — 99396 PREV VISIT EST AGE 40-64: CPT | Performed by: FAMILY MEDICINE

## 2022-05-24 PROCEDURE — 99213 OFFICE O/P EST LOW 20 MIN: CPT | Performed by: FAMILY MEDICINE

## 2022-05-24 RX ORDER — MONTELUKAST SODIUM 10 MG/1
10 TABLET ORAL
Qty: 30 TABLET | Refills: 5 | Status: SHIPPED | OUTPATIENT
Start: 2022-05-24

## 2022-05-24 RX ORDER — KETOCONAZOLE 20 MG/G
CREAM TOPICAL DAILY
Qty: 60 G | Refills: 2 | Status: SHIPPED | OUTPATIENT
Start: 2022-05-24

## 2022-05-24 RX ORDER — OMEPRAZOLE 20 MG/1
20 CAPSULE, DELAYED RELEASE ORAL DAILY
Qty: 30 CAPSULE | Refills: 1 | Status: SHIPPED | OUTPATIENT
Start: 2022-05-24 | End: 2022-06-15

## 2022-05-24 NOTE — ASSESSMENT & PLAN NOTE
Likely contributing to dry cough   Pt will use singulair first for post nasal drip for 1 week then add prilosec 1 tab daily in am

## 2022-05-24 NOTE — ASSESSMENT & PLAN NOTE
Has been using over the counter cream - areas are itchy   Will try ketoconazole cream apply at bedtime for 4 weeks

## 2022-05-24 NOTE — PROGRESS NOTES
Subjective     Daniel Perez is a 45 y o   male and is here for routine health maintenance  The patient reports dry cough, rash on skin and feet    History of Present Illness     Pt here for physical today   Has a cyst on his right arm  Present many years  No pain, no change in size  Sleeps well  Works 3rd shift  Complains of dry cough, during his day  Some heartburn      Well Adult Physical   Patient here for a comprehensive physical exam       Diet and Physical Activity  Diet: well balanced diet  Weight concerns: Patient has class 1 obesity (BMI 30-34  9)  Exercise: intermittently      Depression Screen  PHQ-2/9 Depression Screening    Little interest or pleasure in doing things: 0 - not at all  Feeling down, depressed, or hopeless: 0 - not at all  PHQ-2 Score: 0  PHQ-2 Interpretation: Negative depression screen          General Health  Hearing: Normal:  bilateral  Vision: no vision problems  Dental: regular dental visits      Cancer Screening  Colononoscopy up to date, had done 2019 with 5 year follow up  PSA will get blood work     Smoker NO   Annual screening with low-dose helical computed tomography (CT) for patients age 54 to 76 years with history of smoking at least 30 pack-years and, if a former smoker, had quit within the previous 15 years      The following portions of the patient's history were reviewed and updated as appropriate: allergies, current medications, past family history, past medical history, past social history, past surgical history and problem list     Review of Systems     Review of Systems   Constitutional: Negative  Negative for fatigue and fever  HENT: Negative  Eyes: Negative  Respiratory: Positive for cough  Negative for shortness of breath  Cardiovascular: Negative  Gastrointestinal: Negative  Endocrine: Negative  Genitourinary: Negative  Musculoskeletal: Negative  Skin: Positive for rash  Allergic/Immunologic: Negative      Neurological: Negative  Psychiatric/Behavioral: The patient is nervous/anxious  Past Medical History     Past Medical History:   Diagnosis Date    Anxiety     GERD (gastroesophageal reflux disease)        Past Surgical History     Past Surgical History:   Procedure Laterality Date    COLONOSCOPY  11/25/2019       Social History     Social History     Socioeconomic History    Marital status: Single     Spouse name: None    Number of children: None    Years of education: None    Highest education level: None   Occupational History    None   Tobacco Use    Smoking status: Never Smoker    Smokeless tobacco: Never Used   Substance and Sexual Activity    Alcohol use: None    Drug use: None    Sexual activity: None   Other Topics Concern    None   Social History Narrative    None     Social Determinants of Health     Financial Resource Strain: Not on file   Food Insecurity: Not on file   Transportation Needs: Not on file   Physical Activity: Not on file   Stress: Not on file   Social Connections: Not on file   Intimate Partner Violence: Not on file   Housing Stability: Not on file       Family History     Family History   Problem Relation Age of Onset    Hypertension Mother     No Known Problems Father        Current Medications       Current Outpatient Medications:     ketoconazole (NIZORAL) 2 % cream, Apply topically daily, Disp: 60 g, Rfl: 2    montelukast (SINGULAIR) 10 mg tablet, Take 1 tablet (10 mg total) by mouth daily at bedtime, Disp: 30 tablet, Rfl: 5    omeprazole (PriLOSEC) 20 mg delayed release capsule, Take 1 capsule (20 mg total) by mouth in the morning , Disp: 30 capsule, Rfl: 1    PARoxetine (PAXIL) 30 mg tablet, Take 1 tablet by mouth daily , Disp: , Rfl:      Allergies     No Known Allergies    Objective     /80   Pulse 74   Temp (!) 97 3 °F (36 3 °C)   Ht 5' 10" (1 778 m)   Wt 103 kg (228 lb)   SpO2 95%   BMI 32 71 kg/m²      Physical Exam  Vitals and nursing note reviewed  Constitutional:       Appearance: He is well-developed  HENT:      Head: Normocephalic  Right Ear: External ear normal       Left Ear: External ear normal       Nose: Nose normal    Eyes:      Conjunctiva/sclera: Conjunctivae normal       Pupils: Pupils are equal, round, and reactive to light  Cardiovascular:      Rate and Rhythm: Normal rate and regular rhythm  Heart sounds: Normal heart sounds  Pulmonary:      Effort: Pulmonary effort is normal       Breath sounds: Normal breath sounds  Abdominal:      General: Bowel sounds are normal       Palpations: Abdomen is soft  Musculoskeletal:      Cervical back: Normal range of motion and neck supple  Skin:     General: Skin is warm and dry  Findings: Erythema and rash present  Comments: Along sides of foot with mild erythema and scaly rash   Neurological:      Mental Status: He is alert and oriented to person, place, and time  Psychiatric:         Behavior: Behavior normal          Thought Content:  Thought content normal          Judgment: Judgment normal            No exam data present    Health Maintenance     Health Maintenance   Topic Date Due    Hepatitis C Screening  Never done    HIV Screening  Never done    DTaP,Tdap,and Td Vaccines (1 - Tdap) Never done    BMI: Followup Plan  04/29/2021    COVID-19 Vaccine (3 - Booster for Ortez Peter series) 10/06/2021    Annual Physical  10/15/2021    Depression Screening  05/24/2023    BMI: Adult  05/24/2023    Colorectal Cancer Screening  11/25/2029    Influenza Vaccine  Completed    Pneumococcal Vaccine: Pediatrics (0 to 5 Years) and At-Risk Patients (6 to 59 Years)  Aged Out    HIB Vaccine  Aged Out    Hepatitis B Vaccine  Aged Out    IPV Vaccine  Aged Out    Hepatitis A Vaccine  Aged Out    Meningococcal ACWY Vaccine  Aged Out    HPV Vaccine  Aged [employer] History   Administered Date(s) Administered    COVID-19 PFIZER VACCINE 0 3 ML IM 04/14/2021, 05/06/2021  INFLUENZA 10/22/2014, 11/08/2018, 10/19/2021    Influenza Injectable, MDCK, Preservative Free, Quadrivalent, 0 5 mL 09/27/2019    Influenza, injectable, quadrivalent, preservative free 0 5 mL 08/16/2020    Zoster Vaccine Recombinant 09/19/2020, 12/31/2020       Assessment/Plan       1  Healthy male exam   2  Patient Counseling:   · Nutrition: Stressed importance of a well balanced diet, moderation of sodium/saturated fat, caloric balance and sufficient intake of fiber  · Exercise: Stressed the importance of regular exercise with a goal of 150 minutes per week  · Dental Health: Discussed daily flossing and brushing and regular dental visits     · Immunizations reviewed  · Discussed benefits of screening   · Discussed the patient's BMI with him  The BMI is above average; BMI management plan is completed  3  Cancer Screening   4  Labs   5  Blood work, ketoconazole cream, trial singulair and prilosec for dry cough  6  Follow up in one year      Skye Sweeney DO

## 2022-05-24 NOTE — PROGRESS NOTES
Assessment/Plan:      1  Well adult exam    2  Tinea pedis of both feet  Assessment & Plan:  Has been using over the counter cream - areas are itchy  Will try ketoconazole cream apply at bedtime for 4 weeks     Orders:  -     ketoconazole (NIZORAL) 2 % cream; Apply topically daily    3  Allergy, initial encounter  Assessment & Plan:  Likely contributing to dry cough, will try singulair daily for 1 week to see if it decreases cough symptoms     Orders:  -     montelukast (SINGULAIR) 10 mg tablet; Take 1 tablet (10 mg total) by mouth daily at bedtime    4  Gastroesophageal reflux disease without esophagitis  Assessment & Plan:  Likely contributing to dry cough  Pt will use singulair first for post nasal drip for 1 week then add prilosec 1 tab daily in am      Orders:  -     omeprazole (PriLOSEC) 20 mg delayed release capsule; Take 1 capsule (20 mg total) by mouth in the morning  5  Generalized anxiety disorder  Assessment & Plan:  Stable on paroxetine 30mg, continue    Orders:  -     CBC and differential; Future  -     Comprehensive metabolic panel; Future  -     TSH, 3rd generation with Free T4 reflex; Future  -     CBC and differential  -     Comprehensive metabolic panel  -     TSH, 3rd generation with Free T4 reflex    6  Screening for prostate cancer  -     PSA, total and free; Future  -     PSA, total and free    7  Screening for thyroid disorder  -     TSH, 3rd generation with Free T4 reflex; Future  -     TSH, 3rd generation with Free T4 reflex    8  Screening for lipid disorders  -     Lipid panel; Future  -     Lipid Panel with Direct LDL reflex    9  Screening for endocrine, metabolic and immunity disorder  -     Comprehensive metabolic panel; Future  -     Comprehensive metabolic panel    10  Screening for iron deficiency anemia  -     CBC and differential; Future  -     CBC and differential    11  Need for hepatitis C screening test  -     Hepatitis C antibody;  Future  -     Hepatitis C antibody Subjective:  Chief Complaint   Patient presents with    Physical Exam     Pt here for physical  Dry throat, while ago, dry skin  Patient ID: Hardeep Hayes is a 45 y o  male  Pt is here for a physical and would like to discuss multiple issues  He has a blackhead on his right arm that has been there for many years  It is not painful but he would like to get it removed  He complains of a dry cough  He denies a fever  Or shortness of breath  He has some allergy symptoms and intermittent reflux  He coughs more during the day  He complains of a rash intermittently on his body, dry patches that are slightly itchy but no redness  He also complains of a rash on his feet that is itchy  He has used moisturizing cream on both these with some help  He states his anxiety is stable on Paxil 30 mg daily  Review of Systems   Constitutional: Negative  Negative for fatigue and fever  HENT: Negative  Eyes: Negative  Respiratory: Positive for cough  Cardiovascular: Negative  Gastrointestinal: Negative  Endocrine: Negative  Genitourinary: Negative  Musculoskeletal: Negative  Skin: Positive for rash  Allergic/Immunologic: Negative  Neurological: Negative  Psychiatric/Behavioral: The patient is nervous/anxious  The following portions of the patient's history were reviewed and updated as appropriate: allergies, current medications, past family history, past medical history, past social history, past surgical history and problem list     Objective:  Vitals:    05/24/22 0821 05/24/22 0900   BP: 120/90 120/80   Pulse: 74    Temp: (!) 97 3 °F (36 3 °C)    SpO2: 95%    Weight: 103 kg (228 lb)    Height: 5' 10" (1 778 m)       Physical Exam  Vitals and nursing note reviewed  Constitutional:       Appearance: He is well-developed  He is obese  HENT:      Head: Normocephalic and atraumatic  Cardiovascular:      Rate and Rhythm: Normal rate and regular rhythm  Heart sounds: Normal heart sounds  Pulmonary:      Effort: Pulmonary effort is normal       Breath sounds: Normal breath sounds  Abdominal:      General: Bowel sounds are normal       Palpations: Abdomen is soft  Skin:     General: Skin is warm and dry  Findings: Erythema and rash present  Comments: Scaly dry patches on hands without erythema  Mild erythema and scaliness on feet bilaterally   Neurological:      Mental Status: He is alert and oriented to person, place, and time  Psychiatric:         Behavior: Behavior normal          Thought Content:  Thought content normal          Judgment: Judgment normal

## 2022-05-24 NOTE — PATIENT INSTRUCTIONS
Ketoconazole apply at bedtime to both feet, small amount for 4 weeks  Cortaid 10 to hands and other dry patches if more irritated  Eucerin , elzbieta, lubriderm daily after shower  Singulair 1 tab daily at bedtime for 1 week, notice if cough is less  Then add in prilosec in addition for 1 week - in your am before first meal - week  Dermatology - right arm     Wellness Visit for Adults   AMBULATORY CARE:   A wellness visit  is when you see your healthcare provider to get screened for health problems  Your healthcare provider will also give you advice on how to stay healthy  Write down your questions so you remember to ask them  Ask your healthcare provider how often you should have a wellness visit  What happens at a wellness visit:  Your healthcare provider will ask about your health, and your family history of health problems  This includes high blood pressure, heart disease, and cancer  He or she will ask if you have symptoms that concern you, if you smoke, and about your mood  You may also be asked about your intake of medicines, supplements, food, and alcohol  Any of the following may be done: Your weight  will be checked  Your height may also be checked so your body mass index (BMI) can be calculated  Your BMI shows if you are at a healthy weight  Your blood pressure  and heart rate will be checked  Your temperature may also be checked  Blood and urine tests  may be done  Blood tests may be done to check your cholesterol levels  Abnormal cholesterol levels increase your risk for heart disease and stroke  You may also need a blood or urine test to check for diabetes if you are at increased risk  Urine tests may be done to look for signs of an infection or kidney disease  A physical exam  includes checking your heartbeat and lungs with a stethoscope  Your healthcare provider may also check your skin to look for sun damage  Screening tests  may be recommended   A screening test is done to check for diseases that may not cause symptoms  The screening tests you may need depend on your age, gender, family history, and lifestyle habits  For example, colorectal screening may be recommended if you are 48years old or older  Screening tests you need if you are a woman:   A Pap smear  is used to screen for cervical cancer  Pap smears are usually done every 3 to 5 years depending on your age  You may need them more often if you have had abnormal Pap smear test results in the past  Ask your healthcare provider how often you should have a Pap smear  A mammogram  is an x-ray of your breasts to screen for breast cancer  Experts recommend mammograms every 2 years starting at age 48 years  You may need a mammogram at age 52 years or younger if you have an increased risk for breast cancer  Talk to your healthcare provider about when you should start having mammograms and how often you need them  Vaccines you may need:   Get an influenza vaccine  every year  The influenza vaccine protects you from the flu  Several types of viruses cause the flu  The viruses change over time, so new vaccines are made each year  Get a tetanus-diphtheria (Td) booster vaccine  every 10 years  This vaccine protects you against tetanus and diphtheria  Tetanus is a severe infection that may cause painful muscle spasms and lockjaw  Diphtheria is a severe bacterial infection that causes a thick covering in the back of your mouth and throat  Get a human papillomavirus (HPV) vaccine  if you are female and aged 23 to 32 or male 23 to 24 and never received it  This vaccine protects you from HPV infection  HPV is the most common infection spread by sexual contact  HPV may also cause vaginal, penile, and anal cancers  Get a pneumococcal vaccine  if you are aged 72 years or older  The pneumococcal vaccine is an injection given to protect you from pneumococcal disease  Pneumococcal disease is an infection caused by pneumococcal bacteria   The infection may cause pneumonia, meningitis, or an ear infection  Get a shingles vaccine  if you are 60 or older, even if you have had shingles before  The shingles vaccine is an injection to protect you from the varicella-zoster virus  This is the same virus that causes chickenpox  Shingles is a painful rash that develops in people who had chickenpox or have been exposed to the virus  How to eat healthy:  My Plate is a model for planning healthy meals  It shows the types and amounts of foods that should go on your plate  Fruits and vegetables make up about half of your plate, and grains and protein make up the other half  A serving of dairy is included on the side of your plate  The amount of calories and serving sizes you need depends on your age, gender, weight, and height  Examples of healthy foods are listed below:  Eat a variety of vegetables  such as dark green, red, and orange vegetables  You can also include canned vegetables low in sodium (salt) and frozen vegetables without added butter or sauces  Eat a variety of fresh fruits , canned fruit in 100% juice, frozen fruit, and dried fruit  Include whole grains  At least half of the grains you eat should be whole grains  Examples include whole-wheat bread, wheat pasta, brown rice, and whole-grain cereals such as oatmeal     Eat a variety of protein foods such as seafood (fish and shellfish), lean meat, and poultry without skin (turkey and chicken)  Examples of lean meats include pork leg, shoulder, or tenderloin, and beef round, sirloin, tenderloin, and extra lean ground beef  Other protein foods include eggs and egg substitutes, beans, peas, soy products, nuts, and seeds  Choose low-fat dairy products such as skim or 1% milk or low-fat yogurt, cheese, and cottage cheese  Limit unhealthy fats  such as butter, hard margarine, and shortening  Exercise:  Exercise at least 30 minutes per day on most days of the week   Some examples of exercise include walking, biking, dancing, and swimming  You can also fit in more physical activity by taking the stairs instead of the elevator or parking farther away from stores  Include muscle strengthening activities 2 days each week  Regular exercise provides many health benefits  It helps you manage your weight, and decreases your risk for type 2 diabetes, heart disease, stroke, and high blood pressure  Exercise can also help improve your mood  Ask your healthcare provider about the best exercise plan for you  General health and safety guidelines:   Do not smoke  Nicotine and other chemicals in cigarettes and cigars can cause lung damage  Ask your healthcare provider for information if you currently smoke and need help to quit  E-cigarettes or smokeless tobacco still contain nicotine  Talk to your healthcare provider before you use these products  Limit alcohol  A drink of alcohol is 12 ounces of beer, 5 ounces of wine, or 1½ ounces of liquor  Lose weight, if needed  Being overweight increases your risk of certain health conditions  These include heart disease, high blood pressure, type 2 diabetes, and certain types of cancer  Protect your skin  Do not sunbathe or use tanning beds  Use sunscreen with a SPF 15 or higher  Apply sunscreen at least 15 minutes before you go outside  Reapply sunscreen every 2 hours  Wear protective clothing, hats, and sunglasses when you are outside  Drive safely  Always wear your seatbelt  Make sure everyone in your car wears a seatbelt  A seatbelt can save your life if you are in an accident  Do not use your cell phone when you are driving  This could distract you and cause an accident  Pull over if you need to make a call or send a text message  Practice safe sex  Use latex condoms if are sexually active and have more than one partner  Your healthcare provider may recommend screening tests for sexually transmitted infections (STIs)      Wear helmets, lifejackets, and protective gear  Always wear a helmet when you ride a bike or motorcycle, go skiing, or play sports that could cause a head injury  Wear protective equipment when you play sports  Wear a lifejacket when you are on a boat or doing water sports  © Copyright [employer] 2022 Information is for End User's use only and may not be sold, redistributed or otherwise used for commercial purposes  All illustrations and images included in CareNotes® are the copyrighted property of [employer]  [facility] Vanessa Chatterjee   The above information is an  only  It is not intended as medical advice for individual conditions or treatments  Talk to your doctor, nurse or pharmacist before following any medical regimen to see if it is safe and effective for you

## 2022-05-24 NOTE — ASSESSMENT & PLAN NOTE
Likely contributing to dry cough, will try singulair daily for 1 week to see if it decreases cough symptoms

## 2022-05-28 LAB
ALBUMIN SERPL-MCNC: 4.7 G/DL (ref 3.8–4.9)
ALBUMIN/GLOB SERPL: 2 {RATIO} (ref 1.2–2.2)
ALP SERPL-CCNC: 84 IU/L (ref 44–121)
ALT SERPL-CCNC: 43 IU/L (ref 0–44)
AST SERPL-CCNC: 25 IU/L (ref 0–40)
BASOPHILS # BLD AUTO: 0.1 X10E3/UL (ref 0–0.2)
BASOPHILS NFR BLD AUTO: 1 %
BILIRUB SERPL-MCNC: 0.3 MG/DL (ref 0–1.2)
BUN SERPL-MCNC: 14 MG/DL (ref 6–24)
BUN/CREAT SERPL: 14 (ref 9–20)
CALCIUM SERPL-MCNC: 9.7 MG/DL (ref 8.7–10.2)
CHLORIDE SERPL-SCNC: 97 MMOL/L (ref 96–106)
CHOLEST SERPL-MCNC: 238 MG/DL (ref 100–199)
CO2 SERPL-SCNC: 26 MMOL/L (ref 20–29)
CREAT SERPL-MCNC: 0.99 MG/DL (ref 0.76–1.27)
EGFR: 92 ML/MIN/1.73
EOSINOPHIL # BLD AUTO: 0.3 X10E3/UL (ref 0–0.4)
EOSINOPHIL NFR BLD AUTO: 4 %
ERYTHROCYTE [DISTWIDTH] IN BLOOD BY AUTOMATED COUNT: 12.7 % (ref 11.6–15.4)
GLOBULIN SER-MCNC: 2.4 G/DL (ref 1.5–4.5)
GLUCOSE SERPL-MCNC: 88 MG/DL (ref 65–99)
HCT VFR BLD AUTO: 46.2 % (ref 37.5–51)
HCV AB S/CO SERPL IA: <0.1 S/CO RATIO (ref 0–0.9)
HDLC SERPL-MCNC: 53 MG/DL
HGB BLD-MCNC: 15.4 G/DL (ref 13–17.7)
IMM GRANULOCYTES # BLD: 0 X10E3/UL (ref 0–0.1)
IMM GRANULOCYTES NFR BLD: 0 %
LDLC SERPL CALC-MCNC: 157 MG/DL (ref 0–99)
LDLC/HDLC SERPL: 3 RATIO (ref 0–3.6)
LYMPHOCYTES # BLD AUTO: 1.9 X10E3/UL (ref 0.7–3.1)
LYMPHOCYTES NFR BLD AUTO: 29 %
MCH RBC QN AUTO: 30.7 PG (ref 26.6–33)
MCHC RBC AUTO-ENTMCNC: 33.3 G/DL (ref 31.5–35.7)
MCV RBC AUTO: 92 FL (ref 79–97)
MONOCYTES # BLD AUTO: 0.6 X10E3/UL (ref 0.1–0.9)
MONOCYTES NFR BLD AUTO: 9 %
NEUTROPHILS # BLD AUTO: 3.8 X10E3/UL (ref 1.4–7)
NEUTROPHILS NFR BLD AUTO: 57 %
PLATELET # BLD AUTO: 265 X10E3/UL (ref 150–450)
POTASSIUM SERPL-SCNC: 5.3 MMOL/L (ref 3.5–5.2)
PROT SERPL-MCNC: 7.1 G/DL (ref 6–8.5)
PSA FREE MFR SERPL: 31.3 %
PSA FREE SERPL-MCNC: 2.57 NG/ML
PSA SERPL-MCNC: 8.2 NG/ML (ref 0–4)
RBC # BLD AUTO: 5.01 X10E6/UL (ref 4.14–5.8)
SL AMB VLDL CHOLESTEROL CALC: 28 MG/DL (ref 5–40)
SODIUM SERPL-SCNC: 138 MMOL/L (ref 134–144)
TRIGL SERPL-MCNC: 153 MG/DL (ref 0–149)
TSH SERPL DL<=0.005 MIU/L-ACNC: 1.91 UIU/ML (ref 0.45–4.5)
WBC # BLD AUTO: 6.6 X10E3/UL (ref 3.4–10.8)

## 2022-06-05 DIAGNOSIS — R97.20 ELEVATED PSA: Primary | ICD-10-CM

## 2022-06-05 PROBLEM — E78.49 OTHER HYPERLIPIDEMIA: Status: ACTIVE | Noted: 2022-06-05

## 2022-06-06 ENCOUNTER — TELEPHONE (OUTPATIENT)
Dept: FAMILY MEDICINE CLINIC | Facility: CLINIC | Age: 52
End: 2022-06-06

## 2022-06-06 NOTE — TELEPHONE ENCOUNTER
----- Message from Tapan Garcia DO sent at 6/5/2022 11:32 PM EDT -----  Your cholesterol is much higher than last time  Is this something you can improve with changes in your diet and increased activity  Your prostate level is also high  We should repeat this level in 3 months  Your potassium is just slightly elevated    All other labs are ok

## 2022-06-15 DIAGNOSIS — K21.9 GASTROESOPHAGEAL REFLUX DISEASE WITHOUT ESOPHAGITIS: ICD-10-CM

## 2022-06-15 RX ORDER — OMEPRAZOLE 20 MG/1
20 CAPSULE, DELAYED RELEASE ORAL DAILY
Qty: 90 CAPSULE | Refills: 1 | Status: SHIPPED | OUTPATIENT
Start: 2022-06-15

## 2022-10-10 ENCOUNTER — OFFICE VISIT (OUTPATIENT)
Dept: FAMILY MEDICINE CLINIC | Facility: CLINIC | Age: 52
End: 2022-10-10
Payer: COMMERCIAL

## 2022-10-10 VITALS
OXYGEN SATURATION: 96 % | TEMPERATURE: 97.6 F | DIASTOLIC BLOOD PRESSURE: 76 MMHG | BODY MASS INDEX: 32.21 KG/M2 | HEART RATE: 92 BPM | HEIGHT: 70 IN | WEIGHT: 225 LBS | SYSTOLIC BLOOD PRESSURE: 124 MMHG

## 2022-10-10 DIAGNOSIS — R97.20 ELEVATED PSA: ICD-10-CM

## 2022-10-10 DIAGNOSIS — B35.3 TINEA PEDIS OF BOTH FEET: ICD-10-CM

## 2022-10-10 DIAGNOSIS — R97.20 ELEVATED PROSTATE SPECIFIC ANTIGEN (PSA): Primary | ICD-10-CM

## 2022-10-10 DIAGNOSIS — K21.9 GASTROESOPHAGEAL REFLUX DISEASE WITHOUT ESOPHAGITIS: ICD-10-CM

## 2022-10-10 DIAGNOSIS — F41.1 GENERALIZED ANXIETY DISORDER: ICD-10-CM

## 2022-10-10 DIAGNOSIS — T78.40XD ALLERGY, SUBSEQUENT ENCOUNTER: ICD-10-CM

## 2022-10-10 DIAGNOSIS — J30.9 ALLERGIC RHINITIS, UNSPECIFIED SEASONALITY, UNSPECIFIED TRIGGER: ICD-10-CM

## 2022-10-10 DIAGNOSIS — Z23 NEED FOR INFLUENZA VACCINATION: ICD-10-CM

## 2022-10-10 PROCEDURE — 90471 IMMUNIZATION ADMIN: CPT | Performed by: NURSE PRACTITIONER

## 2022-10-10 PROCEDURE — 90682 RIV4 VACC RECOMBINANT DNA IM: CPT | Performed by: NURSE PRACTITIONER

## 2022-10-10 PROCEDURE — 99214 OFFICE O/P EST MOD 30 MIN: CPT | Performed by: NURSE PRACTITIONER

## 2022-10-10 NOTE — PROGRESS NOTES
Name: Kaitlin Sherman      : 1970      MRN: [id number]  Encounter Provider: CHRISTINA Rosales  Encounter Date: 10/10/2022   Encounter department: [facility]     1  Elevated prostate specific antigen (PSA)  Assessment & Plan:  Recheck PSA now    Orders:  -     PSA, total and free; Future  -     PSA, total and free    2  Need for influenza vaccination  -     influenza vaccine, quadrivalent, recombinant, PF, 0 5 mL, for patients 18 yr+ (FLUBLOK)    3  Allergic rhinitis, unspecified seasonality, unspecified trigger  -     Allergen Profile, Food-Basic; Future  -     Allergens w/Comp Rflx Area 1; Future  -     Allergen Profile, Food-Basic  -     Allergens w/Comp Rflx Area 1    4  Elevated PSA  Assessment & Plan:  Recheck PSA now      5  Generalized anxiety disorder  Assessment & Plan:  Stable with paxil        6  Allergy, subsequent encounter  Assessment & Plan:  Check allergy labs  Continue singulair      7  Gastroesophageal reflux disease without esophagitis  Assessment & Plan:  Stable with diet modifications  Can use OTC pepcid, zantac if interested  As needed        8  Tinea pedis of both feet  Assessment & Plan:  Ketoconazole cream to expensive, can use otc lotrimin             Subjective      Here today to discuss rechecking his prostate levels in blood work- had labs in may 2022- PSA 8 2- due for recheck  Taking an over the counter prostate supplement which is helping with his nocturia  Stream has been steady, not interrupting  Requesting blood work for allergies- environmental and common foods- sometimes reacts to milk- diarrhea  Once reacted to a sauce on chicken 10+years ago unsure the ingredients though  Had colonoscopy - was told repeat in 5 years  Dr Amberly Bright     Review of Systems   Constitutional: Negative  Negative for chills and fever  HENT: Negative  Negative for ear pain and sore throat  Eyes: Negative    Negative for pain and visual disturbance  Respiratory: Negative  Negative for cough and shortness of breath  Cardiovascular: Negative  Negative for chest pain and palpitations  Gastrointestinal: Negative  Negative for abdominal pain and vomiting  Genitourinary: Negative  Negative for dysuria and hematuria  Musculoskeletal: Negative for arthralgias and back pain  Skin: Negative for color change and rash  Allergic/Immunologic: Positive for environmental allergies and food allergies  Neurological: Negative  Negative for seizures and syncope  Psychiatric/Behavioral: The patient is nervous/anxious (stable)  All other systems reviewed and are negative  Current Outpatient Medications on File Prior to Visit   Medication Sig   • montelukast (SINGULAIR) 10 mg tablet Take 1 tablet (10 mg total) by mouth daily at bedtime   • omeprazole (PriLOSEC) 20 mg delayed release capsule TAKE 1 CAPSULE (20 MG TOTAL) BY MOUTH IN THE MORNING  • PARoxetine (PAXIL) 30 mg tablet Take 1 tablet by mouth daily    • [DISCONTINUED] ketoconazole (NIZORAL) 2 % cream Apply topically daily       Objective     /76   Pulse 92   Temp 97 6 °F (36 4 °C) (Tympanic)   Ht 5' 10" (1 778 m)   Wt 102 kg (225 lb)   SpO2 96%   BMI 32 28 kg/m²     Physical Exam  Vitals and nursing note reviewed  Constitutional:       Appearance: Normal appearance  He is obese  HENT:      Head: Normocephalic  Eyes:      Extraocular Movements: Extraocular movements intact  Pupils: Pupils are equal, round, and reactive to light  Cardiovascular:      Rate and Rhythm: Normal rate and regular rhythm  Pulses:           Radial pulses are 1+ on the right side and 1+ on the left side  Heart sounds: Normal heart sounds  Pulmonary:      Effort: Pulmonary effort is normal       Breath sounds: Normal breath sounds  Abdominal:      General: Bowel sounds are normal       Palpations: Abdomen is soft     Musculoskeletal:      Cervical back: Normal range of motion  Right lower leg: No edema  Left lower leg: No edema  Skin:     General: Skin is warm and dry  Neurological:      Mental Status: He is alert and oriented to person, place, and time  Psychiatric:         Mood and Affect: Mood is anxious  Behavior: Behavior normal          Thought Content:  Thought content normal          Judgment: Judgment normal        CHRISTINA Rodriguez

## 2022-10-10 NOTE — PATIENT INSTRUCTIONS
Check PSA and allergy profile- these do not need to be fasting  Cream for your foot you can use over the counter Lotrimin  Flu shot given today

## 2022-10-11 PROBLEM — Z00.00 WELL ADULT EXAM: Status: RESOLVED | Noted: 2022-05-24 | Resolved: 2022-10-11

## 2022-10-13 LAB
ALMOND IGE QN: 0.11 KU/L
B-LACTOGLOB MF77 IGE QN: <0.1 KU/L
BAKER'S YEAST IGG-MCNC: 7.9 MCG/ML
BUTABARBITAL SERPLBLD CFM-MCNC: <0.1 KU/L
CASEIN IGE QN: <0.1 KU/L
CASEIN IGG-MCNC: 8.6 MCG/ML
CASHEW NUT IGE QN: <0.1 KU/L
CHOCOLATE IGG-MCNC: 3.4 MCG/ML
CODFISH IGE QN: <0.1 KU/L
CORN IGG-MCNC: 13.1 MCG/ML
DEPRECATED A-LACTALB IGE RAST QL: 0
DEPRECATED ALMOND IGE RAST QL: ABNORMAL
DEPRECATED B-LACTOGLOB IGE RAST QL: 0
DEPRECATED CASEIN IGE RAST QL: 0
DEPRECATED CASHEW NUT IGE RAST QL: 0
DEPRECATED CODFISH IGE RAST QL: 0
DEPRECATED EGG WHITE IGE RAST QL: ABNORMAL
DEPRECATED HAZELNUT IGE RAST QL: ABNORMAL
DEPRECATED MILK IGE RAST QL: ABNORMAL
DEPRECATED OVALB IGE RAST QL: ABNORMAL
DEPRECATED OVOMUCOID IGE RAST QL: 0
DEPRECATED PEANUT IGE RAST QL: ABNORMAL
DEPRECATED SALMON IGE RAST QL: 0
DEPRECATED SCALLOP IGE RAST QL: 0
DEPRECATED SESAME SEED IGE RAST QL: ABNORMAL
DEPRECATED SHRIMP IGE RAST QL: 0
DEPRECATED SOYBEAN IGE RAST QL: ABNORMAL
DEPRECATED TUNA IGE RAST QL: 0
DEPRECATED WALNUT IGE RAST QL: 0
DEPRECATED WHEAT IGE RAST QL: 1
EGG WHITE IGE QN: 0.32 KU/L
EGG WHITE IGG-MCNC: 15.5 MCG/ML
HAZELNUT IGE QN: 0.13 KU/L
MILK IGE QN: 0.2 KU/L
OVALB IGE QN: 0.31 KU/L
OVOMUCOID IGE QN: <0.1 KU/L
PEANUT (RARA H) 1 IGE QN: <0.1 KU/L
PEANUT (RARA H) 2 IGE QN: <0.1 KU/L
PEANUT (RARA H) 3 IGE QN: 0.16 KU/L
PEANUT (RARA H) 6 IGE QN: <0.1 KU/L
PEANUT (RARA H) 8 IGE QN: <0.1 KU/L
PEANUT (RARA H) 9 IGE QN: <0.1 KU/L
PEANUT IGE QN: 0.28 KU/L
PSA FREE MFR SERPL: 44 % (CALC)
PSA FREE SERPL-MCNC: 0.4 NG/ML
PSA SERPL-MCNC: 0.9 NG/ML
SALMON IGE QN: <0.1 KU/L
SCALLOP IGE QN: <0.1 KU/L
SESAME SEED IGE QN: 0.2 KU/L
SHRIMP IGE QN: <0.1 KU/L
SOYBEAN IGE QN: 0.19 KU/L
TUNA IGE QN: <0.1 KU/L
WALNUT IGE QN: <0.1 KU/L
WHEAT IGE QN: 0.44 KU/L
WHEAT IGG-MCNC: 57.3 MCG/ML

## 2022-11-08 ENCOUNTER — TELEPHONE (OUTPATIENT)
Dept: FAMILY MEDICINE CLINIC | Facility: CLINIC | Age: 52
End: 2022-11-08

## 2022-11-08 DIAGNOSIS — Z91.010 PEANUT ALLERGY: Primary | ICD-10-CM

## 2022-11-08 RX ORDER — EPINEPHRINE 0.3 MG/.3ML
0.3 INJECTION SUBCUTANEOUS ONCE
Qty: 0.6 ML | Refills: 0 | Status: SHIPPED | OUTPATIENT
Start: 2022-11-08 | End: 2022-11-08

## 2022-11-08 NOTE — TELEPHONE ENCOUNTER
----- Message from Donna Cornell, [address] sent at 11/8/2022 11:58 AM EST -----  Your blood work shows low level positive allergies to egg whites, peanuts, wheat, cows milk, soybean, sesame seed, hazelnut, almond, corn, yeast, chocolate  I would avoid the foods mentioned above to see if symptoms resolve- I also recommend following up with an allergist  Based off your result for peanut allergy-your reactivity is most associated with more systemic and or severe reactions  For that I will prescribe an epi-pen for you to carry with you incase of severe reaction  Repeat PSA was normal  We will repeat PSA in 1 year

## 2022-12-23 DIAGNOSIS — K21.9 GASTROESOPHAGEAL REFLUX DISEASE WITHOUT ESOPHAGITIS: ICD-10-CM

## 2022-12-23 RX ORDER — OMEPRAZOLE 20 MG/1
20 CAPSULE, DELAYED RELEASE ORAL DAILY
Qty: 90 CAPSULE | Refills: 1 | Status: SHIPPED | OUTPATIENT
Start: 2022-12-23

## 2023-09-01 DIAGNOSIS — Z13.29 SCREENING FOR THYROID DISORDER: ICD-10-CM

## 2023-09-01 DIAGNOSIS — Z13.29 SCREENING FOR ENDOCRINE, METABOLIC AND IMMUNITY DISORDER: ICD-10-CM

## 2023-09-01 DIAGNOSIS — Z13.0 SCREENING FOR ENDOCRINE, METABOLIC AND IMMUNITY DISORDER: ICD-10-CM

## 2023-09-01 DIAGNOSIS — Z13.228 SCREENING FOR ENDOCRINE, METABOLIC AND IMMUNITY DISORDER: ICD-10-CM

## 2023-09-01 DIAGNOSIS — Z13.220 SCREENING FOR LIPID DISORDERS: Primary | ICD-10-CM

## 2023-09-19 ENCOUNTER — OFFICE VISIT (OUTPATIENT)
Dept: FAMILY MEDICINE CLINIC | Facility: CLINIC | Age: 53
End: 2023-09-19
Payer: COMMERCIAL

## 2023-09-19 VITALS
DIASTOLIC BLOOD PRESSURE: 70 MMHG | BODY MASS INDEX: 31.35 KG/M2 | OXYGEN SATURATION: 98 % | HEIGHT: 70 IN | SYSTOLIC BLOOD PRESSURE: 132 MMHG | TEMPERATURE: 97.7 F | WEIGHT: 219 LBS | HEART RATE: 69 BPM

## 2023-09-19 DIAGNOSIS — Z00.00 ANNUAL PHYSICAL EXAM: Primary | ICD-10-CM

## 2023-09-19 DIAGNOSIS — K21.9 GASTROESOPHAGEAL REFLUX DISEASE WITHOUT ESOPHAGITIS: ICD-10-CM

## 2023-09-19 DIAGNOSIS — F41.1 GENERALIZED ANXIETY DISORDER: ICD-10-CM

## 2023-09-19 DIAGNOSIS — R97.20 ELEVATED PSA: ICD-10-CM

## 2023-09-19 DIAGNOSIS — T78.40XA ALLERGY, INITIAL ENCOUNTER: ICD-10-CM

## 2023-09-19 PROCEDURE — 99396 PREV VISIT EST AGE 40-64: CPT | Performed by: NURSE PRACTITIONER

## 2023-09-19 RX ORDER — MONTELUKAST SODIUM 10 MG/1
10 TABLET ORAL
Qty: 90 TABLET | Refills: 3 | Status: SHIPPED | OUTPATIENT
Start: 2023-09-19

## 2023-09-19 RX ORDER — OMEPRAZOLE 20 MG/1
20 CAPSULE, DELAYED RELEASE ORAL DAILY
Qty: 90 CAPSULE | Refills: 3 | Status: SHIPPED | OUTPATIENT
Start: 2023-09-19

## 2023-09-19 NOTE — PATIENT INSTRUCTIONS
Your blood work shows low level positive allergies to egg whites, peanuts, wheat, cows milk, soybean, sesame seed, hazelnut, almond, corn, yeast, chocolate. I would avoid the foods mentioned above to see if your gastro-intestinal symptoms resolve Based off your result for peanut allergy-your reactivity is most associated with more systemic and or severe reactions. For that I will prescribe an epi-pen for you to carry with you incase of severe reaction. Complete fasting labs    Wellness Visit for Adults   AMBULATORY CARE:   A wellness visit  is when you see your healthcare provider to get screened for health problems. Your healthcare provider will also give you advice on how to stay healthy. Write down your questions so you remember to ask them. Ask your healthcare provider how often you should have a wellness visit. What happens at a wellness visit:  Your healthcare provider will ask about your health, and your family history of health problems. This includes high blood pressure, heart disease, and cancer. He or she will ask if you have symptoms that concern you, if you smoke, and about your mood. You may also be asked about your intake of medicines, supplements, food, and alcohol. Any of the following may be done: Your weight  will be checked. Your height may also be checked so your body mass index (BMI) can be calculated. Your BMI shows if you are at a healthy weight. Your blood pressure  and heart rate will be checked. Your temperature may also be checked. Blood and urine tests  may be done. Blood tests may be done to check your cholesterol levels. Abnormal cholesterol levels increase your risk for heart disease and stroke. You may also need a blood or urine test to check for diabetes if you are at increased risk. Urine tests may be done to look for signs of an infection or kidney disease. A physical exam  includes checking your heartbeat and lungs with a stethoscope.  Your healthcare provider may also check your skin to look for sun damage. Screening tests  may be recommended. A screening test is done to check for diseases that may not cause symptoms. The screening tests you may need depend on your age, gender, family history, and lifestyle habits. For example, colorectal screening may be recommended if you are 48years old or older. Screening tests you need if you are a woman:   A Pap smear  is used to screen for cervical cancer. Pap smears are usually done every 3 to 5 years depending on your age. You may need them more often if you have had abnormal Pap smear test results in the past. Ask your healthcare provider how often you should have a Pap smear. A mammogram  is an x-ray of your breasts to screen for breast cancer. Experts recommend mammograms every 2 years starting at age 48 years. You may need a mammogram at age 52 years or younger if you have an increased risk for breast cancer. Talk to your healthcare provider about when you should start having mammograms and how often you need them. Vaccines you may need:   Get an influenza vaccine  every year. The influenza vaccine protects you from the flu. Several types of viruses cause the flu. The viruses change over time, so new vaccines are made each year. Get a tetanus-diphtheria (Td) booster vaccine  every 10 years. This vaccine protects you against tetanus and diphtheria. Tetanus is a severe infection that may cause painful muscle spasms and lockjaw. Diphtheria is a severe bacterial infection that causes a thick covering in the back of your mouth and throat. Get a human papillomavirus (HPV) vaccine  if you are female and aged 23 to 32 or male 23 to 24 and never received it. This vaccine protects you from HPV infection. HPV is the most common infection spread by sexual contact. HPV may also cause vaginal, penile, and anal cancers. Get a pneumococcal vaccine  if you are aged 72 years or older.  The pneumococcal vaccine is an injection given to protect you from pneumococcal disease. Pneumococcal disease is an infection caused by pneumococcal bacteria. The infection may cause pneumonia, meningitis, or an ear infection. Get a shingles vaccine  if you are 60 or older, even if you have had shingles before. The shingles vaccine is an injection to protect you from the varicella-zoster virus. This is the same virus that causes chickenpox. Shingles is a painful rash that develops in people who had chickenpox or have been exposed to the virus. How to eat healthy:  My Plate is a model for planning healthy meals. It shows the types and amounts of foods that should go on your plate. Fruits and vegetables make up about half of your plate, and grains and protein make up the other half. A serving of dairy is included on the side of your plate. The amount of calories and serving sizes you need depends on your age, gender, weight, and height. Examples of healthy foods are listed below:  Eat a variety of vegetables  such as dark green, red, and orange vegetables. You can also include canned vegetables low in sodium (salt) and frozen vegetables without added butter or sauces. Eat a variety of fresh fruits , canned fruit in 100% juice, frozen fruit, and dried fruit. Include whole grains. At least half of the grains you eat should be whole grains. Examples include whole-wheat bread, wheat pasta, brown rice, and whole-grain cereals such as oatmeal.    Eat a variety of protein foods such as seafood (fish and shellfish), lean meat, and poultry without skin (turkey and chicken). Examples of lean meats include pork leg, shoulder, or tenderloin, and beef round, sirloin, tenderloin, and extra lean ground beef. Other protein foods include eggs and egg substitutes, beans, peas, soy products, nuts, and seeds. Choose low-fat dairy products such as skim or 1% milk or low-fat yogurt, cheese, and cottage cheese.     Limit unhealthy fats  such as butter, hard margarine, and shortening. Exercise:  Exercise at least 30 minutes per day on most days of the week. Some examples of exercise include walking, biking, dancing, and swimming. You can also fit in more physical activity by taking the stairs instead of the elevator or parking farther away from stores. Include muscle strengthening activities 2 days each week. Regular exercise provides many health benefits. It helps you manage your weight, and decreases your risk for type 2 diabetes, heart disease, stroke, and high blood pressure. Exercise can also help improve your mood. Ask your healthcare provider about the best exercise plan for you. General health and safety guidelines:   Do not smoke. Nicotine and other chemicals in cigarettes and cigars can cause lung damage. Ask your healthcare provider for information if you currently smoke and need help to quit. E-cigarettes or smokeless tobacco still contain nicotine. Talk to your healthcare provider before you use these products. Limit alcohol. A drink of alcohol is 12 ounces of beer, 5 ounces of wine, or 1½ ounces of liquor. Lose weight, if needed. Being overweight increases your risk of certain health conditions. These include heart disease, high blood pressure, type 2 diabetes, and certain types of cancer. Protect your skin. Do not sunbathe or use tanning beds. Use sunscreen with a SPF 15 or higher. Apply sunscreen at least 15 minutes before you go outside. Reapply sunscreen every 2 hours. Wear protective clothing, hats, and sunglasses when you are outside. Drive safely. Always wear your seatbelt. Make sure everyone in your car wears a seatbelt. A seatbelt can save your life if you are in an accident. Do not use your cell phone when you are driving. This could distract you and cause an accident. Pull over if you need to make a call or send a text message. Practice safe sex.   Use latex condoms if are sexually active and have more than one partner. Your healthcare provider may recommend screening tests for sexually transmitted infections (STIs). Wear helmets, lifejackets, and protective gear. Always wear a helmet when you ride a bike or motorcycle, go skiing, or play sports that could cause a head injury. Wear protective equipment when you play sports. Wear a lifejacket when you are on a boat or doing water sports. © Copyright [employer] 2022 Information is for End User's use only and may not be sold, redistributed or otherwise used for commercial purposes. The above information is an  only. It is not intended as medical advice for individual conditions or treatments. Talk to your doctor, nurse or pharmacist before following any medical regimen to see if it is safe and effective for you.

## 2023-09-19 NOTE — PROGRESS NOTES
[address] [facility]    NAME: Rhonda Flores  AGE: 46 y.o. SEX: male  : 1970     DATE: 2023     Assessment and Plan:     Problem List Items Addressed This Visit        Digestive    Gastroesophageal reflux disease without esophagitis     Stable with prilosec         Relevant Medications    omeprazole (PriLOSEC) 20 mg delayed release capsule       Other    Generalized anxiety disorder     Follows with psychiatry  Stable with paroxetine 30mg daily           Allergies    Relevant Medications    montelukast (SINGULAIR) 10 mg tablet    Body mass index (BMI) of 31.0 to 31.9 in adult     Continue with weight loss  Healthy dietary choices and exercise        Other Visit Diagnoses     Annual physical exam    -  Primary    Elevated PSA        Relevant Orders    PSA Total (Reflex To Free)          Immunizations and preventive care screenings were discussed with patient today. Appropriate education was printed on patient's after visit summary. Discussed risks and benefits of prostate cancer screening. We discussed the controversial history of PSA screening for prostate cancer in the [facility] as well as the risk of over detection and over treatment of prostate cancer by way of PSA screening. The patient understands that PSA blood testing is an imperfect way to screen for prostate cancer and that elevated PSA levels in the blood may also be caused by infection, inflammation, prostatic trauma or manipulation, urological procedures, or by benign prostatic enlargement. The role of the digital rectal examination in prostate cancer screening was also discussed and I discussed with him that there is large interobserver variability in the findings of digital rectal examination. Counseling:  Dental Health: discussed importance of regular tooth brushing, flossing, and dental visits.   Injury prevention: discussed safety/seat belts, safety helmets, smoke detectors, carbon dioxide detectors, and smoking near bedding or upholstery. · Exercise: the importance of regular exercise/physical activity was discussed. Recommend exercise 3-5 times per week for at least 30 minutes. No follow-ups on file. Chief Complaint:     Chief Complaint   Patient presents with   • Physical Exam     Yearly       History of Present Illness:     Adult Annual Physical   Patient here for a comprehensive physical exam. The patient reports no problems. Your blood work shows low level positive allergies to egg whites, peanuts, wheat, cows milk, soybean, sesame seed, hazelnut, almond, corn, yeast, chocolate. I would avoid the foods mentioned above to see if symptoms resolve- I also recommend following up with an allergist. Based off your result for peanut allergy-your reactivity is most associated with more systemic and or severe reactions. For that I will prescribe an epi-pen for you to carry with you incase of severe reaction.      Diet and Physical Activity  · Diet/Nutrition: well balanced diet and tries to avoid foods listed above to help control his bloating. · Exercise: walking. Depression Screening  PHQ-2/9 Depression Screening    Little interest or pleasure in doing things: 0 - not at all  Feeling down, depressed, or hopeless: 1 - several days  PHQ-2 Score: 1  PHQ-2 Interpretation: Negative depression screen       General Health  · Sleep: sleeps well. · Hearing: normal - bilateral.  · Vision: wears reading glasses. · Dental: regular dental visits.  Health  · Symptoms include: none     Review of Systems:     Review of Systems   Constitutional: Negative. Negative for chills and fever. HENT: Negative. Negative for ear pain and sore throat. Eyes: Negative. Negative for pain and visual disturbance. Respiratory: Negative. Negative for cough and shortness of breath. Cardiovascular: Negative. Negative for chest pain and palpitations. Gastrointestinal: Negative. Negative for abdominal pain and vomiting. Genitourinary: Negative. Negative for dysuria and hematuria. Musculoskeletal: Negative for arthralgias and back pain. Skin: Negative for color change and rash. Allergic/Immunologic: Positive for environmental allergies and food allergies. Neurological: Negative. Negative for seizures and syncope. Psychiatric/Behavioral: The patient is nervous/anxious (stable). All other systems reviewed and are negative.      Past Medical History:     Past Medical History:   Diagnosis Date   • Anxiety    • GERD (gastroesophageal reflux disease)       Past Surgical History:     Past Surgical History:   Procedure Laterality Date   • COLONOSCOPY  11/25/2019      Family History:     Family History   Problem Relation Age of Onset   • Hypertension Mother    • No Known Problems Father       Social History:     Social History     Socioeconomic History   • Marital status: Single     Spouse name: None   • Number of children: None   • Years of education: None   • Highest education level: None   Occupational History   • None   Tobacco Use   • Smoking status: Never   • Smokeless tobacco: Never   Substance and Sexual Activity   • Alcohol use: None   • Drug use: None   • Sexual activity: None   Other Topics Concern   • None   Social History Narrative   • None     Social Determinants of Health     Financial Resource Strain: Not on file   Food Insecurity: Not on file   Transportation Needs: Not on file   Physical Activity: Not on file   Stress: Not on file   Social Connections: Not on file   Intimate Partner Violence: Not on file   Housing Stability: Not on file      Current Medications:     Current Outpatient Medications   Medication Sig Dispense Refill   • montelukast (SINGULAIR) 10 mg tablet Take 1 tablet (10 mg total) by mouth daily at bedtime 90 tablet 3   • omeprazole (PriLOSEC) 20 mg delayed release capsule Take 1 capsule (20 mg total) by mouth daily 90 capsule 3   • PARoxetine (PAXIL) 30 mg tablet Take 1 tablet by mouth daily      • EPINEPHrine (EPIPEN) 0.3 mg/0.3 mL SOAJ Inject 0.3 mL (0.3 mg total) into a muscle once for 1 dose 0.6 mL 0     No current facility-administered medications for this visit. Allergies:     No Known Allergies   Physical Exam:     /70   Pulse 69   Temp 97.7 °F (36.5 °C) (Tympanic)   Ht 5' 10" (1.778 m)   Wt 99.3 kg (219 lb)   SpO2 98%   BMI 31.42 kg/m²     Physical Exam  Vitals and nursing note reviewed. Constitutional:       General: He is not in acute distress. Appearance: Normal appearance. He is well-developed. HENT:      Head: Normocephalic and atraumatic. Right Ear: Tympanic membrane, ear canal and external ear normal.      Left Ear: Tympanic membrane, ear canal and external ear normal.      Nose: Nose normal.      Mouth/Throat:      Mouth: Mucous membranes are moist.      Pharynx: Oropharynx is clear. Eyes:      Extraocular Movements: Extraocular movements intact. Conjunctiva/sclera: Conjunctivae normal.      Pupils: Pupils are equal, round, and reactive to light. Cardiovascular:      Rate and Rhythm: Normal rate and regular rhythm. Pulses:           Radial pulses are 2+ on the right side and 2+ on the left side. Heart sounds: Normal heart sounds. No murmur heard. Pulmonary:      Effort: Pulmonary effort is normal. No respiratory distress. Breath sounds: Normal breath sounds. Abdominal:      General: Bowel sounds are normal.      Palpations: Abdomen is soft. Tenderness: There is no abdominal tenderness. Musculoskeletal:      Cervical back: Neck supple. Right lower leg: No edema. Left lower leg: No edema. Lymphadenopathy:      Cervical: No cervical adenopathy. Skin:     General: Skin is warm and dry. Neurological:      Mental Status: He is alert and oriented to person, place, and time.    Psychiatric:         Mood and Affect: Mood normal. Behavior: Behavior normal.         Thought Content:  Thought content normal.          Riki Has, [address]

## 2023-09-20 LAB
ALBUMIN SERPL-MCNC: 4.3 G/DL (ref 3.6–5.1)
ALBUMIN/GLOB SERPL: 1.7 (CALC) (ref 1–2.5)
ALP SERPL-CCNC: 90 U/L (ref 35–144)
ALT SERPL-CCNC: 29 U/L (ref 9–46)
AST SERPL-CCNC: 19 U/L (ref 10–35)
BASOPHILS # BLD AUTO: 48 CELLS/UL (ref 0–200)
BASOPHILS NFR BLD AUTO: 1 %
BILIRUB SERPL-MCNC: 0.6 MG/DL (ref 0.2–1.2)
BUN SERPL-MCNC: 11 MG/DL (ref 7–25)
BUN/CREAT SERPL: NORMAL (CALC) (ref 6–22)
CALCIUM SERPL-MCNC: 9.3 MG/DL (ref 8.6–10.3)
CHLORIDE SERPL-SCNC: 102 MMOL/L (ref 98–110)
CHOLEST SERPL-MCNC: 186 MG/DL
CHOLEST/HDLC SERPL: 3.1 (CALC)
CO2 SERPL-SCNC: 29 MMOL/L (ref 20–32)
CREAT SERPL-MCNC: 1.11 MG/DL (ref 0.7–1.3)
EOSINOPHIL # BLD AUTO: 206 CELLS/UL (ref 15–500)
EOSINOPHIL NFR BLD AUTO: 4.3 %
ERYTHROCYTE [DISTWIDTH] IN BLOOD BY AUTOMATED COUNT: 12.8 % (ref 11–15)
GFR/BSA.PRED SERPLBLD CYS-BASED-ARV: 79 ML/MIN/1.73M2
GLOBULIN SER CALC-MCNC: 2.6 G/DL (CALC) (ref 1.9–3.7)
GLUCOSE SERPL-MCNC: 92 MG/DL (ref 65–99)
HCT VFR BLD AUTO: 45 % (ref 38.5–50)
HDLC SERPL-MCNC: 60 MG/DL
HGB BLD-MCNC: 15.4 G/DL (ref 13.2–17.1)
LDLC SERPL CALC-MCNC: 100 MG/DL (CALC)
LYMPHOCYTES # BLD AUTO: 1738 CELLS/UL (ref 850–3900)
LYMPHOCYTES NFR BLD AUTO: 36.2 %
MCH RBC QN AUTO: 31.8 PG (ref 27–33)
MCHC RBC AUTO-ENTMCNC: 34.2 G/DL (ref 32–36)
MCV RBC AUTO: 92.8 FL (ref 80–100)
MONOCYTES # BLD AUTO: 475 CELLS/UL (ref 200–950)
MONOCYTES NFR BLD AUTO: 9.9 %
NEUTROPHILS # BLD AUTO: 2333 CELLS/UL (ref 1500–7800)
NEUTROPHILS NFR BLD AUTO: 48.6 %
NONHDLC SERPL-MCNC: 126 MG/DL (CALC)
PLATELET # BLD AUTO: 248 THOUSAND/UL (ref 140–400)
PMV BLD REES-ECKER: 11.2 FL (ref 7.5–12.5)
POTASSIUM SERPL-SCNC: 4.7 MMOL/L (ref 3.5–5.3)
PROT SERPL-MCNC: 6.9 G/DL (ref 6.1–8.1)
RBC # BLD AUTO: 4.85 MILLION/UL (ref 4.2–5.8)
SODIUM SERPL-SCNC: 138 MMOL/L (ref 135–146)
TRIGL SERPL-MCNC: 159 MG/DL
TSH SERPL-ACNC: 0.87 MIU/L (ref 0.4–4.5)
WBC # BLD AUTO: 4.8 THOUSAND/UL (ref 3.8–10.8)

## 2023-09-21 ENCOUNTER — TELEPHONE (OUTPATIENT)
Dept: FAMILY MEDICINE CLINIC | Facility: CLINIC | Age: 53
End: 2023-09-21

## 2023-09-21 LAB — PSA SERPL-MCNC: 0.8 NG/ML

## 2023-09-21 NOTE — TELEPHONE ENCOUNTER
----- Message from [address] [place] [address] sent at 9/21/2023 11:45 AM EDT -----  Lmom to call.    ----- Message -----  From: CHRISTINA Zamudio  Sent: 9/21/2023   8:22 AM EDT  To: [address]    Hi Patrick, Your labs are all stable, cholesterol levels improved. We will repeat in 1 year.  Recommend continued healthy diet and exercise

## 2023-09-21 NOTE — TELEPHONE ENCOUNTER
Tried calling patient, teams keeps crashing in the blood work room, will try to call back later   Also sent mychart as backup

## 2023-09-21 NOTE — TELEPHONE ENCOUNTER
----- Message from Bridgette Bee, [address] sent at 9/21/2023  8:21 AM EDT -----  PSA is normal, repeat in 1 year

## 2023-09-21 NOTE — TELEPHONE ENCOUNTER
----- Message from [address] [place] [address] sent at 9/21/2023 11:45 AM EDT -----  Lmom to call.    ----- Message -----  From: CHRISTINA Dotson  Sent: 9/21/2023   8:22 AM EDT  To: [address]    Hi Patrick, Your labs are all stable, cholesterol levels improved. We will repeat in 1 year.  Recommend continued healthy diet and exercise

## 2023-09-21 NOTE — TELEPHONE ENCOUNTER
----- Message from Megan Angelo, [address] [place] [address] sent at 9/21/2023  8:22 AM EDT -----  Lizett Myers, Your labs are all stable, cholesterol levels improved. We will repeat in 1 year.  Recommend continued healthy diet and exercise

## 2024-09-16 DIAGNOSIS — T78.40XA ALLERGY, INITIAL ENCOUNTER: ICD-10-CM

## 2024-09-16 DIAGNOSIS — K21.9 GASTROESOPHAGEAL REFLUX DISEASE WITHOUT ESOPHAGITIS: ICD-10-CM

## 2024-09-17 RX ORDER — MONTELUKAST SODIUM 10 MG/1
10 TABLET ORAL
Qty: 90 TABLET | Refills: 0 | Status: SHIPPED | OUTPATIENT
Start: 2024-09-17

## 2024-09-17 NOTE — TELEPHONE ENCOUNTER
Patient needs an appointment. Please contact the patient to schedule an appointment. Last office visit: 09/19/23

## 2024-12-11 ENCOUNTER — OFFICE VISIT (OUTPATIENT)
Dept: FAMILY MEDICINE CLINIC | Facility: CLINIC | Age: 54
End: 2024-12-11
Payer: COMMERCIAL

## 2024-12-11 VITALS
DIASTOLIC BLOOD PRESSURE: 80 MMHG | BODY MASS INDEX: 31.5 KG/M2 | WEIGHT: 220 LBS | HEART RATE: 74 BPM | OXYGEN SATURATION: 99 % | TEMPERATURE: 98 F | SYSTOLIC BLOOD PRESSURE: 130 MMHG | HEIGHT: 70 IN

## 2024-12-11 DIAGNOSIS — Z13.220 SCREENING FOR LIPID DISORDERS: ICD-10-CM

## 2024-12-11 DIAGNOSIS — Z13.228 SCREENING FOR ENDOCRINE, METABOLIC AND IMMUNITY DISORDER: ICD-10-CM

## 2024-12-11 DIAGNOSIS — K21.9 GASTROESOPHAGEAL REFLUX DISEASE WITHOUT ESOPHAGITIS: ICD-10-CM

## 2024-12-11 DIAGNOSIS — Z13.0 SCREENING FOR DEFICIENCY ANEMIA: ICD-10-CM

## 2024-12-11 DIAGNOSIS — Z00.00 WELL ADULT EXAM: Primary | ICD-10-CM

## 2024-12-11 DIAGNOSIS — Z13.29 SCREENING FOR ENDOCRINE, METABOLIC AND IMMUNITY DISORDER: ICD-10-CM

## 2024-12-11 DIAGNOSIS — Z12.5 SCREENING FOR PROSTATE CANCER: ICD-10-CM

## 2024-12-11 DIAGNOSIS — F41.1 GENERALIZED ANXIETY DISORDER: ICD-10-CM

## 2024-12-11 DIAGNOSIS — Z13.0 SCREENING FOR ENDOCRINE, METABOLIC AND IMMUNITY DISORDER: ICD-10-CM

## 2024-12-11 DIAGNOSIS — T78.40XD ALLERGY, SUBSEQUENT ENCOUNTER: ICD-10-CM

## 2024-12-11 PROCEDURE — 99396 PREV VISIT EST AGE 40-64: CPT | Performed by: FAMILY MEDICINE

## 2024-12-11 NOTE — PATIENT INSTRUCTIONS
"Claritin or allegra or zyrtec in [facility] pm 1 daily     Patient Education     Routine physical for adults   The Basics   Written by the doctors and editors at [facility]   What is a physical? -- A physical is a routine visit, or \"check-up,\" with your doctor. You might also hear it called a \"wellness visit\" or \"preventive visit.\"  During each visit, the doctor will:   Ask about your physical and mental health   Ask about your habits, behaviors, and lifestyle   Do an exam   Give you vaccines if needed   Talk to you about any medicines you take   Give advice about your health   Answer your questions  Getting regular check-ups is an important part of taking care of your health. It can help your doctor find and treat any problems you have. But it's also important for preventing health problems.  A routine physical is different from a \"sick visit.\" A sick visit is when you see a doctor because of a health concern or problem. Since physicals are scheduled ahead of time, you can think about what you want to ask the doctor.  How often should I get a physical? -- It depends on your age and health. In general, for people age 21 years and older:   If you are younger than 50 years, you might be able to get a physical every 3 years.   If you are 50 years or older, your doctor might recommend a physical every year.  If you have an ongoing health condition, like diabetes or high blood pressure, your doctor will probably want to see you more often.  What happens during a physical? -- In general, each visit will include:   Physical exam - The doctor or nurse will check your height, weight, heart rate, and blood pressure. They will also look at your eyes and ears. They will ask about how you are feeling and whether you have any symptoms that bother you.   Medicines - It's a good idea to bring a list of all the medicines you take to each doctor visit. Your doctor will talk to you about your medicines and answer any questions. " "Tell them if you are having any side effects that bother you. You should also tell them if you are having trouble paying for any of your medicines.   Habits and behaviors - This includes:   Your diet   Your exercise habits   Whether you smoke, drink alcohol, or use drugs   Whether you are sexually active   Whether you feel safe at home  Your doctor will talk to you about things you can do to improve your health and lower your risk of health problems. They will also offer help and support. For example, if you want to quit smoking, they can give you advice and might prescribe medicines. If you want to improve your diet or get more physical activity, they can help you with this, too.   Lab tests, if needed - The tests you get will depend on your age and situation. For example, your doctor might want to check your:   Cholesterol   Blood sugar   Iron level   Vaccines - The recommended vaccines will depend on your age, health, and what vaccines you already had. Vaccines are very important because they can prevent certain serious or deadly infections.   Discussion of screening - \"Screening\" means checking for diseases or other health problems before they cause symptoms. Your doctor can recommend screening based on your age, risk, and preferences. This might include tests to check for:   Cancer, such as breast, prostate, cervical, ovarian, colorectal, prostate, lung, or skin cancer   Sexually transmitted infections, such as chlamydia and gonorrhea   Mental health conditions like depression and anxiety  Your doctor will talk to you about the different types of screening tests. They can help you decide which screenings to have. They can also explain what the results might mean.   Answering questions - The physical is a good time to ask the doctor or nurse questions about your health. If needed, they can refer you to other doctors or specialists, too.  Adults older than 65 years often need other care, too. As you get older, " your doctor will talk to you about:   How to prevent falling at home   Hearing or vision tests   Memory testing   How to take your medicines safely   Making sure that you have the help and support you need at home  All topics are updated as new evidence becomes available and our peer review process is complete.  This topic retrieved from [employer] on: May 02, 2024.  Topic 145598 Version 1.0  Release: 32.4.3 - C32.122  © 2024 UpToDate, Inc. and/or its affiliates. All rights reserved.  Consumer Information Use and Disclaimer   Disclaimer: This generalized information is a limited summary of diagnosis, treatment, and/or medication information. It is not meant to be comprehensive and should be used as a tool to help the user understand and/or assess potential diagnostic and treatment options. It does NOT include all information about conditions, treatments, medications, side effects, or risks that may apply to a specific patient. It is not intended to be medical advice or a substitute for the medical advice, diagnosis, or treatment of a health care provider based on the health care provider's examination and assessment of a patient's specific and unique circumstances. Patients must speak with a health care provider for complete information about their health, medical questions, and treatment options, including any risks or benefits regarding use of medications. This information does not endorse any treatments or medications as safe, effective, or approved for treating a specific patient. UpToDate, Inc. and its affiliates disclaim any warranty or liability relating to this information or the use thereof.The use of this information is governed by the Terms of Use, available at https://www.wolterskluwer.com/en/know/clinical-effectiveness-terms. 2024© UpToDate, Inc. and its affiliates and/or licensors. All rights reserved.  Copyright   © 2024 UpToDate, Inc. and/or its affiliates. All rights reserved.

## 2024-12-11 NOTE — PROGRESS NOTES
ADULT ANNUAL PHYSICAL  [facility] PRACT[facility]    NAME: Patrick Reyes  AGE: 54 y.o. SEX: male  : 1970     DATE: 12/15/2024     Assessment and Plan:     1. Well adult exam  2. Allergy, subsequent encounter  Assessment & Plan:  Continue antihistamine claritin or zyrtec or allegra  Restart singulair 10mg daily   3. Gastroesophageal reflux disease without esophagitis  Assessment & Plan:  Controlled on omperazole   4. Generalized anxiety disorder  Assessment & Plan:  Controlled on paroxetine 30mg daily  5. Body mass index (BMI) of 31.0 to 31.9 in adult  6. Screening for deficiency anemia  -     CBC and differential; Future  -     CBC and differential  7. Screening for lipid disorders  -     Lipid Panel with Direct LDL reflex  8. Screening for endocrine, metabolic and immunity disorder  -     Comprehensive metabolic panel; Future  -     TSH, 3rd generation with Free T4 reflex; Future  -     Comprehensive metabolic panel  -     TSH, 3rd generation with Free T4 reflex  9. Screening for prostate cancer  -     PSA, total and free; Future  -     PSA, total and free      Immunizations and preventive care screenings were discussed with patient today. Appropriate education was printed on patient's after visit summary.    Counseling:  Dental Health: discussed importance of regular tooth brushing, flossing, and dental visits.  Exercise: the importance of regular exercise/physical activity was discussed. Recommend exercise 3-5 times per week for at least 30 minutes.       Depression Screening and Follow-up Plan: Patient was screened for depression during today's encounter. They screened negative with a PHQ-2 score of 0.        No follow-ups on file.     Chief Complaint:     Chief Complaint   Patient presents with    Physical Exam      History of Present Illness:     Pt is here for a physical today.   Complains of allergy symptoms- taking claritin.           Review of Systems:      Review of Systems   Constitutional: Negative.  Negative for fatigue and fever.   HENT:  Positive for congestion and postnasal drip.    Eyes: Negative.    Respiratory: Negative.  Negative for cough.    Cardiovascular: Negative.    Gastrointestinal: Negative.    Endocrine: Negative.    Genitourinary: Negative.    Musculoskeletal: Negative.    Skin: Negative.    Allergic/Immunologic: Negative.    Neurological: Negative.    Psychiatric/Behavioral: Negative.        Past Medical History:     Past Medical History:   Diagnosis Date    Anxiety     GERD (gastroesophageal reflux disease)       Past Surgical History:     Past Surgical History:   Procedure Laterality Date    COLONOSCOPY  11/25/2019      Social History:     Social History     Socioeconomic History    Marital status: Single     Spouse name: None    Number of children: None    Years of education: None    Highest education level: None   Occupational History    None   Tobacco Use    Smoking status: Never    Smokeless tobacco: Never   Vaping Use    Vaping status: Never Used   Substance and Sexual Activity    Alcohol use: Not Currently    Drug use: Never    Sexual activity: None   Other Topics Concern    None   Social History Narrative    None     Social Drivers of Health     Financial Resource Strain: Not on file   Food Insecurity: Not on file   Transportation Needs: Not on file   Physical Activity: Not on file   Stress: Not on file   Social Connections: Not on file   Intimate Partner Violence: Not on file   Housing Stability: Not on file      Family History:     Family History   Problem Relation Age of Onset    Hypertension Mother     No Known Problems Father       Current Medications:     Current Outpatient Medications   Medication Sig Dispense Refill    EPINEPHrine (EPIPEN) 0.3 mg/0.3 mL SOAJ Inject 0.3 mL (0.3 mg total) into a muscle once for 1 dose 0.6 mL 0    montelukast (SINGULAIR) 10 mg tablet TAKE 1 TABLET BY MOUTH DAILY AT BEDTIME 90 tablet 0     "omeprazole (PriLOSEC) 20 mg delayed release capsule TAKE 1 CAPSULE BY MOUTH EVERY DAY 90 capsule 0    PARoxetine (PAXIL) 30 mg tablet Take 1 tablet by mouth daily        No current facility-administered medications for this visit.      Allergies:     No Known Allergies   Physical Exam:     /80   Pulse 74   Temp 98 °F (36.7 °C) (Tympanic)   Ht 5' 10\" (1.778 m)   Wt 99.8 kg (220 lb)   SpO2 99%   BMI 31.57 kg/m²     Physical Exam  Vitals and nursing note reviewed.   Constitutional:       Appearance: He is well-developed. He is obese.   HENT:      Head: Normocephalic.      Right Ear: External ear normal.      Left Ear: External ear normal.      Nose: Nose normal.   Eyes:      Conjunctiva/sclera: Conjunctivae normal.      Pupils: Pupils are equal, round, and reactive to light.   Cardiovascular:      Rate and Rhythm: Normal rate and regular rhythm.   Pulmonary:      Effort: Pulmonary effort is normal.      Breath sounds: Normal breath sounds.   Abdominal:      General: Bowel sounds are normal.      Palpations: Abdomen is soft.   Musculoskeletal:         General: Normal range of motion.      Cervical back: Normal range of motion and neck supple.   Skin:     General: Skin is warm and dry.   Neurological:      Mental Status: He is alert and oriented to person, place, and time.   Psychiatric:         Behavior: Behavior normal.         Thought Content: Thought content normal.         Judgment: Judgment normal.          Ruchi Westfall, [facility]  "

## 2024-12-14 LAB
ALBUMIN SERPL-MCNC: 4.2 G/DL (ref 3.6–5.1)
ALBUMIN/GLOB SERPL: 1.6 (CALC) (ref 1–2.5)
ALP SERPL-CCNC: 93 U/L (ref 35–144)
ALT SERPL-CCNC: 25 U/L (ref 9–46)
AST SERPL-CCNC: 20 U/L (ref 10–35)
BASOPHILS # BLD AUTO: 48 CELLS/UL (ref 0–200)
BASOPHILS NFR BLD AUTO: 0.7 %
BILIRUB SERPL-MCNC: 0.5 MG/DL (ref 0.2–1.2)
BUN SERPL-MCNC: 13 MG/DL (ref 7–25)
BUN/CREAT SERPL: NORMAL (CALC) (ref 6–22)
CALCIUM SERPL-MCNC: 9.3 MG/DL (ref 8.6–10.3)
CHLORIDE SERPL-SCNC: 102 MMOL/L (ref 98–110)
CHOLEST SERPL-MCNC: 186 MG/DL
CHOLEST/HDLC SERPL: 3.3 (CALC)
CO2 SERPL-SCNC: 30 MMOL/L (ref 20–32)
CREAT SERPL-MCNC: 0.9 MG/DL (ref 0.7–1.3)
EOSINOPHIL # BLD AUTO: 211 CELLS/UL (ref 15–500)
EOSINOPHIL NFR BLD AUTO: 3.1 %
ERYTHROCYTE [DISTWIDTH] IN BLOOD BY AUTOMATED COUNT: 13 % (ref 11–15)
GFR/BSA.PRED SERPLBLD CYS-BASED-ARV: 101 ML/MIN/1.73M2
GLOBULIN SER CALC-MCNC: 2.7 G/DL (CALC) (ref 1.9–3.7)
GLUCOSE SERPL-MCNC: 94 MG/DL (ref 65–99)
HCT VFR BLD AUTO: 47 % (ref 38.5–50)
HDLC SERPL-MCNC: 56 MG/DL
HGB BLD-MCNC: 15.5 G/DL (ref 13.2–17.1)
LDLC SERPL CALC-MCNC: 103 MG/DL (CALC)
LYMPHOCYTES # BLD AUTO: 1727 CELLS/UL (ref 850–3900)
LYMPHOCYTES NFR BLD AUTO: 25.4 %
MCH RBC QN AUTO: 31.4 PG (ref 27–33)
MCHC RBC AUTO-ENTMCNC: 33 G/DL (ref 32–36)
MCV RBC AUTO: 95.1 FL (ref 80–100)
MONOCYTES # BLD AUTO: 510 CELLS/UL (ref 200–950)
MONOCYTES NFR BLD AUTO: 7.5 %
NEUTROPHILS # BLD AUTO: 4304 CELLS/UL (ref 1500–7800)
NEUTROPHILS NFR BLD AUTO: 63.3 %
NONHDLC SERPL-MCNC: 130 MG/DL (CALC)
PLATELET # BLD AUTO: 272 THOUSAND/UL (ref 140–400)
PMV BLD REES-ECKER: 11.1 FL (ref 7.5–12.5)
POTASSIUM SERPL-SCNC: 4.6 MMOL/L (ref 3.5–5.3)
PROT SERPL-MCNC: 6.9 G/DL (ref 6.1–8.1)
PSA FREE MFR SERPL: 38 % (CALC)
PSA FREE SERPL-MCNC: 0.5 NG/ML
PSA SERPL-MCNC: 1.3 NG/ML
RBC # BLD AUTO: 4.94 MILLION/UL (ref 4.2–5.8)
SODIUM SERPL-SCNC: 137 MMOL/L (ref 135–146)
TRIGL SERPL-MCNC: 152 MG/DL
TSH SERPL-ACNC: 0.66 MIU/L (ref 0.4–4.5)
WBC # BLD AUTO: 6.8 THOUSAND/UL (ref 3.8–10.8)

## 2024-12-20 DIAGNOSIS — K21.9 GASTROESOPHAGEAL REFLUX DISEASE WITHOUT ESOPHAGITIS: ICD-10-CM

## 2024-12-20 DIAGNOSIS — T78.40XA ALLERGY, INITIAL ENCOUNTER: ICD-10-CM

## 2024-12-20 RX ORDER — MONTELUKAST SODIUM 10 MG/1
10 TABLET ORAL
Qty: 90 TABLET | Refills: 1 | Status: SHIPPED | OUTPATIENT
Start: 2024-12-20

## 2025-01-14 PROBLEM — Z00.00 WELL ADULT EXAM: Status: RESOLVED | Noted: 2022-05-24 | Resolved: 2025-01-14

## 2025-06-17 DIAGNOSIS — T78.40XA ALLERGY, INITIAL ENCOUNTER: ICD-10-CM

## 2025-06-17 DIAGNOSIS — K21.9 GASTROESOPHAGEAL REFLUX DISEASE WITHOUT ESOPHAGITIS: ICD-10-CM

## 2025-06-17 RX ORDER — OMEPRAZOLE 20 MG/1
20 CAPSULE, DELAYED RELEASE ORAL DAILY
Qty: 90 CAPSULE | Refills: 1 | Status: SHIPPED | OUTPATIENT
Start: 2025-06-17

## 2025-06-17 RX ORDER — MONTELUKAST SODIUM 10 MG/1
10 TABLET ORAL
Qty: 90 TABLET | Refills: 1 | Status: SHIPPED | OUTPATIENT
Start: 2025-06-17